# Patient Record
Sex: FEMALE | Race: WHITE | Employment: UNEMPLOYED | URBAN - METROPOLITAN AREA
[De-identification: names, ages, dates, MRNs, and addresses within clinical notes are randomized per-mention and may not be internally consistent; named-entity substitution may affect disease eponyms.]

---

## 2017-02-27 ENCOUNTER — ANESTHESIA EVENT (OUTPATIENT)
Dept: SURGERY | Facility: CLINIC | Age: 27
End: 2017-02-27
Payer: COMMERCIAL

## 2017-02-28 ENCOUNTER — HOSPITAL ENCOUNTER (OUTPATIENT)
Facility: CLINIC | Age: 27
Discharge: HOME | End: 2017-02-28
Attending: OBSTETRICS & GYNECOLOGY | Admitting: OBSTETRICS & GYNECOLOGY
Payer: COMMERCIAL

## 2017-02-28 ENCOUNTER — ANESTHESIA (OUTPATIENT)
Dept: SURGERY | Facility: CLINIC | Age: 27
End: 2017-02-28
Payer: COMMERCIAL

## 2017-02-28 VITALS
SYSTOLIC BLOOD PRESSURE: 133 MMHG | OXYGEN SATURATION: 94 % | WEIGHT: 293 LBS | DIASTOLIC BLOOD PRESSURE: 84 MMHG | BODY MASS INDEX: 43.4 KG/M2 | HEIGHT: 69 IN | TEMPERATURE: 98.3 F | RESPIRATION RATE: 20 BRPM

## 2017-02-28 DIAGNOSIS — G89.18 PAIN AT SURGICAL SITE: Primary | ICD-10-CM

## 2017-02-28 LAB — HCG SERPL QL: NEGATIVE

## 2017-02-28 PROCEDURE — 25000125 ZZHC RX 250: Performed by: OBSTETRICS & GYNECOLOGY

## 2017-02-28 PROCEDURE — 36415 COLL VENOUS BLD VENIPUNCTURE: CPT | Performed by: ANESTHESIOLOGY

## 2017-02-28 PROCEDURE — 84703 CHORIONIC GONADOTROPIN ASSAY: CPT | Performed by: ANESTHESIOLOGY

## 2017-02-28 PROCEDURE — 71000012 ZZH RECOVERY PHASE 1 LEVEL 1 FIRST HR: Performed by: OBSTETRICS & GYNECOLOGY

## 2017-02-28 PROCEDURE — 36000058 ZZH SURGERY LEVEL 3 EA 15 ADDTL MIN: Performed by: OBSTETRICS & GYNECOLOGY

## 2017-02-28 PROCEDURE — 71000027 ZZH RECOVERY PHASE 2 EACH 15 MINS: Performed by: OBSTETRICS & GYNECOLOGY

## 2017-02-28 PROCEDURE — 25800025 ZZH RX 258: Performed by: NURSE ANESTHETIST, CERTIFIED REGISTERED

## 2017-02-28 PROCEDURE — 71000013 ZZH RECOVERY PHASE 1 LEVEL 1 EA ADDTL HR: Performed by: OBSTETRICS & GYNECOLOGY

## 2017-02-28 PROCEDURE — 25000128 H RX IP 250 OP 636: Performed by: NURSE ANESTHETIST, CERTIFIED REGISTERED

## 2017-02-28 PROCEDURE — 25000128 H RX IP 250 OP 636: Performed by: OBSTETRICS & GYNECOLOGY

## 2017-02-28 PROCEDURE — 88305 TISSUE EXAM BY PATHOLOGIST: CPT | Mod: 26 | Performed by: OBSTETRICS & GYNECOLOGY

## 2017-02-28 PROCEDURE — 25000132 ZZH RX MED GY IP 250 OP 250 PS 637: Performed by: OBSTETRICS & GYNECOLOGY

## 2017-02-28 PROCEDURE — 25000132 ZZH RX MED GY IP 250 OP 250 PS 637: Performed by: ANESTHESIOLOGY

## 2017-02-28 PROCEDURE — G0145 SCR C/V CYTO,THINLAYER,RESCR: HCPCS | Performed by: OBSTETRICS & GYNECOLOGY

## 2017-02-28 PROCEDURE — 40000306 ZZH STATISTIC PRE PROC ASSESS II: Performed by: OBSTETRICS & GYNECOLOGY

## 2017-02-28 PROCEDURE — 36000056 ZZH SURGERY LEVEL 3 1ST 30 MIN: Performed by: OBSTETRICS & GYNECOLOGY

## 2017-02-28 PROCEDURE — 25000566 ZZH SEVOFLURANE, EA 15 MIN: Performed by: OBSTETRICS & GYNECOLOGY

## 2017-02-28 PROCEDURE — 25000128 H RX IP 250 OP 636: Performed by: ANESTHESIOLOGY

## 2017-02-28 PROCEDURE — 37000008 ZZH ANESTHESIA TECHNICAL FEE, 1ST 30 MIN: Performed by: OBSTETRICS & GYNECOLOGY

## 2017-02-28 PROCEDURE — 25000125 ZZHC RX 250: Performed by: ANESTHESIOLOGY

## 2017-02-28 PROCEDURE — 37000009 ZZH ANESTHESIA TECHNICAL FEE, EACH ADDTL 15 MIN: Performed by: OBSTETRICS & GYNECOLOGY

## 2017-02-28 PROCEDURE — 25000125 ZZHC RX 250: Performed by: NURSE ANESTHETIST, CERTIFIED REGISTERED

## 2017-02-28 PROCEDURE — 27210794 ZZH OR GENERAL SUPPLY STERILE: Performed by: OBSTETRICS & GYNECOLOGY

## 2017-02-28 PROCEDURE — S0020 INJECTION, BUPIVICAINE HYDRO: HCPCS | Performed by: OBSTETRICS & GYNECOLOGY

## 2017-02-28 PROCEDURE — 88305 TISSUE EXAM BY PATHOLOGIST: CPT | Performed by: OBSTETRICS & GYNECOLOGY

## 2017-02-28 RX ORDER — ONDANSETRON 2 MG/ML
INJECTION INTRAMUSCULAR; INTRAVENOUS PRN
Status: DISCONTINUED | OUTPATIENT
Start: 2017-02-28 | End: 2017-02-28

## 2017-02-28 RX ORDER — CEFAZOLIN SODIUM 1 G/50ML
3 SOLUTION INTRAVENOUS
Status: COMPLETED | OUTPATIENT
Start: 2017-02-28 | End: 2017-02-28

## 2017-02-28 RX ORDER — NALOXONE HYDROCHLORIDE 0.4 MG/ML
.1-.4 INJECTION, SOLUTION INTRAMUSCULAR; INTRAVENOUS; SUBCUTANEOUS
Status: DISCONTINUED | OUTPATIENT
Start: 2017-02-28 | End: 2017-02-28 | Stop reason: HOSPADM

## 2017-02-28 RX ORDER — CEFAZOLIN SODIUM 1 G/3ML
1 INJECTION, POWDER, FOR SOLUTION INTRAMUSCULAR; INTRAVENOUS SEE ADMIN INSTRUCTIONS
Status: DISCONTINUED | OUTPATIENT
Start: 2017-02-28 | End: 2017-02-28 | Stop reason: HOSPADM

## 2017-02-28 RX ORDER — LIDOCAINE HYDROCHLORIDE 10 MG/ML
INJECTION, SOLUTION INFILTRATION; PERINEURAL PRN
Status: DISCONTINUED | OUTPATIENT
Start: 2017-02-28 | End: 2017-02-28

## 2017-02-28 RX ORDER — DEXAMETHASONE SODIUM PHOSPHATE 4 MG/ML
INJECTION, SOLUTION INTRA-ARTICULAR; INTRALESIONAL; INTRAMUSCULAR; INTRAVENOUS; SOFT TISSUE PRN
Status: DISCONTINUED | OUTPATIENT
Start: 2017-02-28 | End: 2017-02-28

## 2017-02-28 RX ORDER — KETOROLAC TROMETHAMINE 30 MG/ML
30 INJECTION, SOLUTION INTRAMUSCULAR; INTRAVENOUS ONCE
Status: COMPLETED | OUTPATIENT
Start: 2017-02-28 | End: 2017-02-28

## 2017-02-28 RX ORDER — BUPIVACAINE HYDROCHLORIDE 2.5 MG/ML
INJECTION, SOLUTION EPIDURAL; INFILTRATION; INTRACAUDAL PRN
Status: DISCONTINUED | OUTPATIENT
Start: 2017-02-28 | End: 2017-02-28 | Stop reason: HOSPADM

## 2017-02-28 RX ORDER — ONDANSETRON 4 MG/1
4 TABLET, ORALLY DISINTEGRATING ORAL EVERY 30 MIN PRN
Status: DISCONTINUED | OUTPATIENT
Start: 2017-02-28 | End: 2017-02-28 | Stop reason: HOSPADM

## 2017-02-28 RX ORDER — SODIUM CHLORIDE, SODIUM LACTATE, POTASSIUM CHLORIDE, CALCIUM CHLORIDE 600; 310; 30; 20 MG/100ML; MG/100ML; MG/100ML; MG/100ML
INJECTION, SOLUTION INTRAVENOUS CONTINUOUS PRN
Status: DISCONTINUED | OUTPATIENT
Start: 2017-02-28 | End: 2017-02-28

## 2017-02-28 RX ORDER — NEOSTIGMINE METHYLSULFATE 1 MG/ML
VIAL (ML) INJECTION PRN
Status: DISCONTINUED | OUTPATIENT
Start: 2017-02-28 | End: 2017-02-28

## 2017-02-28 RX ORDER — ONDANSETRON 2 MG/ML
4 INJECTION INTRAMUSCULAR; INTRAVENOUS EVERY 30 MIN PRN
Status: DISCONTINUED | OUTPATIENT
Start: 2017-02-28 | End: 2017-02-28 | Stop reason: HOSPADM

## 2017-02-28 RX ORDER — SODIUM CHLORIDE, SODIUM LACTATE, POTASSIUM CHLORIDE, CALCIUM CHLORIDE 600; 310; 30; 20 MG/100ML; MG/100ML; MG/100ML; MG/100ML
INJECTION, SOLUTION INTRAVENOUS CONTINUOUS
Status: DISCONTINUED | OUTPATIENT
Start: 2017-02-28 | End: 2017-02-28 | Stop reason: HOSPADM

## 2017-02-28 RX ORDER — GLYCOPYRROLATE 0.2 MG/ML
INJECTION, SOLUTION INTRAMUSCULAR; INTRAVENOUS PRN
Status: DISCONTINUED | OUTPATIENT
Start: 2017-02-28 | End: 2017-02-28

## 2017-02-28 RX ORDER — ACETAMINOPHEN 500 MG
1000 TABLET ORAL ONCE
Status: COMPLETED | OUTPATIENT
Start: 2017-02-28 | End: 2017-02-28

## 2017-02-28 RX ORDER — HYDROMORPHONE HYDROCHLORIDE 1 MG/ML
.3-.5 INJECTION, SOLUTION INTRAMUSCULAR; INTRAVENOUS; SUBCUTANEOUS EVERY 10 MIN PRN
Status: DISCONTINUED | OUTPATIENT
Start: 2017-02-28 | End: 2017-02-28 | Stop reason: HOSPADM

## 2017-02-28 RX ORDER — FENTANYL CITRATE 50 UG/ML
25-50 INJECTION, SOLUTION INTRAMUSCULAR; INTRAVENOUS
Status: DISCONTINUED | OUTPATIENT
Start: 2017-02-28 | End: 2017-02-28 | Stop reason: HOSPADM

## 2017-02-28 RX ORDER — PROPOFOL 10 MG/ML
INJECTION, EMULSION INTRAVENOUS PRN
Status: DISCONTINUED | OUTPATIENT
Start: 2017-02-28 | End: 2017-02-28

## 2017-02-28 RX ORDER — MEPERIDINE HYDROCHLORIDE 25 MG/ML
12.5 INJECTION INTRAMUSCULAR; INTRAVENOUS; SUBCUTANEOUS
Status: DISCONTINUED | OUTPATIENT
Start: 2017-02-28 | End: 2017-02-28 | Stop reason: HOSPADM

## 2017-02-28 RX ORDER — FENTANYL CITRATE 50 UG/ML
INJECTION, SOLUTION INTRAMUSCULAR; INTRAVENOUS PRN
Status: DISCONTINUED | OUTPATIENT
Start: 2017-02-28 | End: 2017-02-28

## 2017-02-28 RX ORDER — HYDROCODONE BITARTRATE AND ACETAMINOPHEN 5; 325 MG/1; MG/1
1 TABLET ORAL ONCE
Status: COMPLETED | OUTPATIENT
Start: 2017-02-28 | End: 2017-02-28

## 2017-02-28 RX ADMIN — PHENYLEPHRINE HYDROCHLORIDE 150 MCG: 10 INJECTION, SOLUTION INTRAMUSCULAR; INTRAVENOUS; SUBCUTANEOUS at 12:34

## 2017-02-28 RX ADMIN — PHENYLEPHRINE HYDROCHLORIDE 100 MCG: 10 INJECTION, SOLUTION INTRAMUSCULAR; INTRAVENOUS; SUBCUTANEOUS at 12:42

## 2017-02-28 RX ADMIN — FENTANYL CITRATE 150 MCG: 50 INJECTION, SOLUTION INTRAMUSCULAR; INTRAVENOUS at 11:41

## 2017-02-28 RX ADMIN — FENTANYL CITRATE 50 MCG: 50 INJECTION, SOLUTION INTRAMUSCULAR; INTRAVENOUS at 12:12

## 2017-02-28 RX ADMIN — ROCURONIUM BROMIDE 50 MG: 10 INJECTION INTRAVENOUS at 11:43

## 2017-02-28 RX ADMIN — GLYCOPYRROLATE 0.8 MG: 0.2 INJECTION, SOLUTION INTRAMUSCULAR; INTRAVENOUS at 13:00

## 2017-02-28 RX ADMIN — MIDAZOLAM HYDROCHLORIDE 2 MG: 1 INJECTION, SOLUTION INTRAMUSCULAR; INTRAVENOUS at 11:37

## 2017-02-28 RX ADMIN — LIDOCAINE HYDROCHLORIDE 50 MG: 10 INJECTION, SOLUTION INFILTRATION; PERINEURAL at 11:41

## 2017-02-28 RX ADMIN — PHENYLEPHRINE HYDROCHLORIDE 100 MCG: 10 INJECTION, SOLUTION INTRAMUSCULAR; INTRAVENOUS; SUBCUTANEOUS at 11:57

## 2017-02-28 RX ADMIN — FENTANYL CITRATE 50 MCG: 50 INJECTION INTRAMUSCULAR; INTRAVENOUS at 13:48

## 2017-02-28 RX ADMIN — PROPOFOL 200 MG: 10 INJECTION, EMULSION INTRAVENOUS at 11:41

## 2017-02-28 RX ADMIN — HYDROMORPHONE HYDROCHLORIDE 0.5 MG: 1 INJECTION, SOLUTION INTRAMUSCULAR; INTRAVENOUS; SUBCUTANEOUS at 13:55

## 2017-02-28 RX ADMIN — PHENYLEPHRINE HYDROCHLORIDE 100 MCG: 10 INJECTION, SOLUTION INTRAMUSCULAR; INTRAVENOUS; SUBCUTANEOUS at 12:21

## 2017-02-28 RX ADMIN — Medication 3 G: at 11:37

## 2017-02-28 RX ADMIN — HYDROCODONE BITARTRATE AND ACETAMINOPHEN 1 TABLET: 5; 325 TABLET ORAL at 16:14

## 2017-02-28 RX ADMIN — FENTANYL CITRATE 50 MCG: 50 INJECTION INTRAMUSCULAR; INTRAVENOUS at 13:33

## 2017-02-28 RX ADMIN — HYDROMORPHONE HYDROCHLORIDE 0.5 MG: 1 INJECTION, SOLUTION INTRAMUSCULAR; INTRAVENOUS; SUBCUTANEOUS at 13:33

## 2017-02-28 RX ADMIN — SODIUM CHLORIDE, POTASSIUM CHLORIDE, SODIUM LACTATE AND CALCIUM CHLORIDE: 600; 310; 30; 20 INJECTION, SOLUTION INTRAVENOUS at 11:37

## 2017-02-28 RX ADMIN — ACETAMINOPHEN 1000 MG: 500 TABLET, FILM COATED ORAL at 11:19

## 2017-02-28 RX ADMIN — FENTANYL CITRATE 50 MCG: 50 INJECTION INTRAMUSCULAR; INTRAVENOUS at 14:19

## 2017-02-28 RX ADMIN — Medication 5 MG: at 13:00

## 2017-02-28 RX ADMIN — DEXAMETHASONE SODIUM PHOSPHATE 4 MG: 4 INJECTION, SOLUTION INTRAMUSCULAR; INTRAVENOUS at 11:43

## 2017-02-28 RX ADMIN — KETOROLAC TROMETHAMINE 30 MG: 30 INJECTION, SOLUTION INTRAMUSCULAR at 11:20

## 2017-02-28 RX ADMIN — PHENYLEPHRINE HYDROCHLORIDE 150 MCG: 10 INJECTION, SOLUTION INTRAMUSCULAR; INTRAVENOUS; SUBCUTANEOUS at 12:28

## 2017-02-28 RX ADMIN — FENTANYL CITRATE 50 MCG: 50 INJECTION, SOLUTION INTRAMUSCULAR; INTRAVENOUS at 13:17

## 2017-02-28 RX ADMIN — ONDANSETRON 4 MG: 2 INJECTION INTRAMUSCULAR; INTRAVENOUS at 12:00

## 2017-02-28 NOTE — IP AVS SNAPSHOT
MRN:[id number]                      After Visit Summary   2/28/2017    Esther Parra    MRN: [id number]           Thank you!     Thank you for choosing [facility] for your care. Our goal is always to provide you with excellent care. Hearing back from our patients is one way we can continue to improve our services. Please take a few minutes to complete the written survey that you may receive in the mail after you visit. If you would like to speak to someone directly about your visit please contact Patient Relations at [phone]. Thank you!          Patient Information     Date Of Birth          1990        About your hospital stay     You were admitted on:  February 28, 2017 You last received care in the:  [facility] PreOP/PostOP    You were discharged on:  February 28, 2017       Who to Call     For medical emergencies, please call 911.  For non-urgent questions about your medical care, please call your primary care provider or clinic, [phone]  For questions related to your surgery, please call your surgery clinic        Attending Provider     Provider Madai Neal MD OB/Gyn       Primary Care Provider Office Phone # Fax #    [facility] [phone] [address] [place] [address]        Your next 10 appointments already scheduled     May 02, 2017  1:30 PM CDT   Six Minute Walk with UC PFL 6 MINUTE WALK 1   [facility] Pulmonary Function Testing ([facility])    [address] [place] [id number] [phone]            May 02, 2017  2:00 PM CDT   PFT VISIT with UC PFL A   [facility] Pulmonary Function Testing ([facility])    [address] [id number] [phone]            May 02, 2017  2:30 PM CDT   (Arrive by 2:15 PM)   Return Interstitial Lung with David Morris Perlman, MD   [facility]  [facility] [facility] ([facility] [place])    [address] [facility]  3rd Floor  [facility] [address] [phone]              Further instructions from your care team       DILATION AND CURETTAGE AND DILATION AND EVACUATION DISCHARGE INSTRUCTIONS    DO NOT DRIVE A CAR, DRINK ALCOHOL OR USE MACHINERY FOR THE NEXT 24 HOURS.  YOU SHOULD WAIT UNTIL YOU HAVE RECOVERED BEFORE MAKING ANY IMPORTANT DECISIONS.    PAIN AND DISCOMFORT  YOU MAY HAVE CRAMPS OR A LOW BACKACHE FOR 24 TO 48 HOURS.  TYLENOL (ACETAMINOPHEN) OR MOTRIN (IBUPROFEN) MAY HELP, OR YOUR DOCTOR MAY GIVE YOU PAIN MEDICINE.  CALL YOUR DOCTOR IF PAIN CANNOT BE CONTROLLED.  YOU MAY FEEL DROWSY AND WEAK FOR A DAY OR TWO.    VAGINAL DISCHARGE  YOU MAY HAVE SOME BLEEDING OR DISCHARGE FOR UP TO TWO WEEKS.  DO NOT DOUCHE, USE TAMPONS OR HAVE SEX (INTERCOURSE) IN THE FIRST WEEK.  CALL YOUR DOCTOR IF YOU SOAK MORE THAN ONE MAXI PAD (SANITARY NAPKIN) PER HOUR, OR IF YOU PASS LARGE BLOOD CLOTS.    OTHER SYMPTOMS  YOU MAY HAVE A LOW FEVER FOR THE FIRST TWO DAYS.  CALL YOUR DOCTOR IF YOUR FEVER GOES OVER 101 DEGREES FAHRENHEIT.    IF YOU HAVE NAUSEA (FEEL SICK TO YOUR STOMACH), STAY IN BED.  TRY DRINKING A SMALL AMOUNT 7-UP, TEA OR SOUP.    DIET AND ACTIVITY  EAT LIGHT MEALS AND DRINK PLENTY OF FLUIDS FOR THE FIRST 24 HOURS (OR LONGER, IF YOU HAVE NAUSEA).    YOU MAY BATHE, SHOWER AND CLIMB STAIRS.  MOST WOMEN CAN RETURN TO WORK AFTER 24 HOURS.  YOU MAY GO BACK TO YOUR OTHER ACTIVITIES AFTER YOUR PAIN GOES AWAY.      GENERAL ANESTHESIA OR SEDATION ADULT DISCHARGE INSTRUCTIONS   SPECIAL PRECAUTIONS FOR 24 HOURS AFTER SURGERY    IT IS NOT UNUSUAL TO FEEL LIGHT-HEADED OR FAINT, UP TO 24 HOURS AFTER SURGERY OR WHILE TAKING PAIN MEDICATION.  IF YOU HAVE THESE SYMPTOMS; SIT FOR A FEW MINUTES BEFORE STANDING AND HAVE SOMEONE ASSIST YOU WHEN YOU GET UP TO WALK OR USE THE BATHROOM.    YOU SHOULD REST AND RELAX FOR THE NEXT 24 HOURS AND YOU MUST  "MAKE ARRANGEMENTS TO HAVE SOMEONE STAY WITH YOU FOR AT LEAST 24 HOURS AFTER YOUR DISCHARGE.  AVOID HAZARDOUS AND STRENUOUS ACTIVITIES.  DO NOT MAKE IMPORTANT DECISIONS FOR 24 HOURS.    DO NOT DRIVE ANY VEHICLE OR OPERATE MECHANICAL EQUIPMENT FOR 24 HOURS FOLLOWING THE END OF YOUR SURGERY.  EVEN THOUGH YOU MAY FEEL NORMAL, YOUR REACTIONS MAY BE AFFECTED BY THE MEDICATION YOU HAVE RECEIVED.    DO NOT DRINK ALCOHOLIC BEVERAGES FOR 24 HOURS FOLLOWING YOUR SURGERY.    DRINK CLEAR LIQUIDS (APPLE JUICE, GINGER ALE, 7-UP, BROTH, ETC.).  PROGRESS TO YOUR REGULAR DIET AS YOU FEEL ABLE.    YOU MAY HAVE A DRY MOUTH, A SORE THROAT, MUSCLES ACHES OR TROUBLE SLEEPING.  THESE SHOULD GO AWAY AFTER 24 HOURS.    CALL YOUR DOCTOR FOR ANY OF THE FOLLOWING:  SIGNS OF INFECTION (FEVER, GROWING TENDERNESS AT THE SURGERY SITE, A LARGE AMOUNT OF DRAINAGE OR BLEEDING, SEVERE PAIN, FOUL-SMELLING DRAINAGE, REDNESS OR SWELLING.    IT HAS BEEN OVER 8 TO 10 HOURS SINCE SURGERY AND YOU ARE STILL NOT ABLE TO URINATE (PASS WATER).     Maximum acetaminophen (Tylenol) dose from all sources should not exceed 4 grams (4000 mg) per day. You had 1000 mg today at 11:20 am      You received Toradol, an IV form of ibuprofen (Motrin) at 11:25 am.  Do not take any ibuprofen products until 05:25 pm.    DR. MADAI MOSQUEDA M.D.       Clinic phone number:  [phone]      Pending Results     Date and Time Order Name Status Description    2/28/2017 1250 Surgical pathology exam In process     2/28/2017 1203 A pap thin layer screen In process             Admission Information     Date & Time Provider Department Dept. Phone    2/28/2017 Madai Mosqueda MD [facility] PreOP/PostOP [phone]      Your Vitals Were     Blood Pressure Temperature Respirations Height Weight Last Period    118/56 (BP Location: Right arm) 98.4  F (36.9  C) (Temporal) 12 1.74 m (5' 8.5\") 161 kg (355 lb) 11/30/2016    Pulse Oximetry BMI (Body Mass Index)                " "94% 53.19 kg/m2          [employer] Information     [employer] lets you send messages to your doctor, view your test results, renew your prescriptions, schedule appointments and more. To sign up, go to [email or web address] . Click on \"Log in\" on the left side of the screen, which will take you to the Welcome page. Then click on \"Sign up Now\" on the right side of the page.     You will be asked to enter the access code listed below, as well as some personal information. Please follow the directions to create your username and password.     Your access code is: [email or web address]  Expires: 2017  1:37 PM     Your access code will  in 90 days. If you need help or a new code, please call your [place] clinic or [phone].        Care EveryWhere ID     This is your Care EveryWhere ID. This could be used by other organizations to access your [place] medical records  [id number]           Review of your medicines      START taking        Dose / Directions    lidocaine 2 % topical gel   Commonly known as:  XYLOCAINE   Used for:  Pain at surgical site        Apply topically 3 times daily as needed for moderate pain   Quantity:  30 mL   Refills:  1         CONTINUE these medicines which may have CHANGED, or have new prescriptions. If we are uncertain of the size of tablets/capsules you have at home, strength may be listed as something that might have changed.        Dose / Directions    acetaZOLAMIDE 250 MG tablet   Commonly known as:  DIAMOX   This may have changed:  when to take this   Used for:  IIH (idiopathic intracranial hypertension), IIH (idiopathic intracranial hypertension)        Dose:  250 mg   Take 1 tablet (250 mg) by mouth 2 times daily   Quantity:  60 tablet   Refills:  11         CONTINUE these medicines which have NOT CHANGED        Dose / Directions    * albuterol 108 (90 BASE) MCG/ACT Inhaler   Commonly known as:  albuterol   Used for:  Hypersensitivity pneumonitis (H)        Dose:  2 puff   Inhale 2 " puffs into the lungs every 6 hours as needed for shortness of breath / dyspnea or wheezing   Quantity:  1 Inhaler   Refills:  3       * albuterol (2.5 MG/3ML) 0.083% neb solution   Used for:  Hypersensitivity pneumonitis (H)        Dose:  2.5 mg   Take 1 vial (2.5 mg) by nebulization 4 times daily   Quantity:  360 mL   Refills:  6       budesonide 1 MG/2ML Susp neb solution   Commonly known as:  PULMICORT   Used for:  Hypersensitivity pneumonitis (H)        Dose:  1 mg   Take 2 mLs (1 mg) by nebulization 2 times daily   Quantity:  60 ampule   Refills:  4       clonazePAM 1 MG tablet   Commonly known as:  klonoPIN        Dose:  0.5-1 mg   Take 0.5-1 tablets by mouth 2 times daily as needed for anxiety.   Quantity:  20 tablet   Refills:  0       Flunisolide HFA 80 MCG/ACT Aers   Used for:  Hypersensitivity pneumonitis (H)        Dose:  2 puff   Inhale 2 puffs into the lungs 2 times daily   Quantity:  1 Inhaler   Refills:  11       IBUPROFEN PO        Dose:  800 mg   Take 800 mg by mouth every 8 hours as needed for moderate pain   Refills:  0       multivitamin, therapeutic with minerals Tabs tablet        Dose:  1 tablet   Take 1 tablet by mouth daily.   Refills:  0       sertraline 100 MG tablet   Commonly known as:  ZOLOFT        Dose:  200 mg   Take 200 mg by mouth daily.   Quantity:  90 tablet   Refills:  3       * Notice:  This list has 2 medication(s) that are the same as other medications prescribed for you. Read the directions carefully, and ask your doctor or other care provider to review them with you.         Where to get your medicines      These medications were sent to [facility] [place], [place] - [address] [facility]  [address] [facility] [address]     Phone:  [phone]     lidocaine 2 % topical gel                Protect others around you: Learn how to safely use, store and throw away your medicines at www.disposemymeds.org.             Medication List: This is a  list of all your medications and when to take them. Check marks below indicate your daily home schedule. Keep this list as a reference.      Medications           Morning Afternoon Evening Bedtime As Needed    acetaZOLAMIDE 250 MG tablet   Commonly known as:  DIAMOX   Take 1 tablet (250 mg) by mouth 2 times daily                                * albuterol 108 (90 BASE) MCG/ACT Inhaler   Commonly known as:  albuterol   Inhale 2 puffs into the lungs every 6 hours as needed for shortness of breath / dyspnea or wheezing                                * albuterol (2.5 MG/3ML) 0.083% neb solution   Take 1 vial (2.5 mg) by nebulization 4 times daily                                budesonide 1 MG/2ML Susp neb solution   Commonly known as:  PULMICORT   Take 2 mLs (1 mg) by nebulization 2 times daily                                clonazePAM 1 MG tablet   Commonly known as:  klonoPIN   Take 0.5-1 tablets by mouth 2 times daily as needed for anxiety.                                Flunisolide HFA 80 MCG/ACT Aers   Inhale 2 puffs into the lungs 2 times daily                                IBUPROFEN PO   Take 800 mg by mouth every 8 hours as needed for moderate pain                                lidocaine 2 % topical gel   Commonly known as:  XYLOCAINE   Apply topically 3 times daily as needed for moderate pain                                multivitamin, therapeutic with minerals Tabs tablet   Take 1 tablet by mouth daily.                                sertraline 100 MG tablet   Commonly known as:  ZOLOFT   Take 200 mg by mouth daily.                                * Notice:  This list has 2 medication(s) that are the same as other medications prescribed for you. Read the directions carefully, and ask your doctor or other care provider to review them with you.

## 2017-02-28 NOTE — DISCHARGE INSTRUCTIONS
DILATION AND CURETTAGE AND DILATION AND EVACUATION DISCHARGE INSTRUCTIONS    DO NOT DRIVE A CAR, DRINK ALCOHOL OR USE MACHINERY FOR THE NEXT 24 HOURS.  YOU SHOULD WAIT UNTIL YOU HAVE RECOVERED BEFORE MAKING ANY IMPORTANT DECISIONS.    PAIN AND DISCOMFORT  YOU MAY HAVE CRAMPS OR A LOW BACKACHE FOR 24 TO 48 HOURS.  TYLENOL (ACETAMINOPHEN) OR MOTRIN (IBUPROFEN) MAY HELP, OR YOUR DOCTOR MAY GIVE YOU PAIN MEDICINE.  CALL YOUR DOCTOR IF PAIN CANNOT BE CONTROLLED.  YOU MAY FEEL DROWSY AND WEAK FOR A DAY OR TWO.    VAGINAL DISCHARGE  YOU MAY HAVE SOME BLEEDING OR DISCHARGE FOR UP TO TWO WEEKS.  DO NOT DOUCHE, USE TAMPONS OR HAVE SEX (INTERCOURSE) IN THE FIRST WEEK.  CALL YOUR DOCTOR IF YOU SOAK MORE THAN ONE MAXI PAD (SANITARY NAPKIN) PER HOUR, OR IF YOU PASS LARGE BLOOD CLOTS.    OTHER SYMPTOMS  YOU MAY HAVE A LOW FEVER FOR THE FIRST TWO DAYS.  CALL YOUR DOCTOR IF YOUR FEVER GOES OVER 101 DEGREES FAHRENHEIT.    IF YOU HAVE NAUSEA (FEEL SICK TO YOUR STOMACH), STAY IN BED.  TRY DRINKING A SMALL AMOUNT 7-UP, TEA OR SOUP.    DIET AND ACTIVITY  EAT LIGHT MEALS AND DRINK PLENTY OF FLUIDS FOR THE FIRST 24 HOURS (OR LONGER, IF YOU HAVE NAUSEA).    YOU MAY BATHE, SHOWER AND CLIMB STAIRS.  MOST WOMEN CAN RETURN TO WORK AFTER 24 HOURS.  YOU MAY GO BACK TO YOUR OTHER ACTIVITIES AFTER YOUR PAIN GOES AWAY.      GENERAL ANESTHESIA OR SEDATION ADULT DISCHARGE INSTRUCTIONS   SPECIAL PRECAUTIONS FOR 24 HOURS AFTER SURGERY    IT IS NOT UNUSUAL TO FEEL LIGHT-HEADED OR FAINT, UP TO 24 HOURS AFTER SURGERY OR WHILE TAKING PAIN MEDICATION.  IF YOU HAVE THESE SYMPTOMS; SIT FOR A FEW MINUTES BEFORE STANDING AND HAVE SOMEONE ASSIST YOU WHEN YOU GET UP TO WALK OR USE THE BATHROOM.    YOU SHOULD REST AND RELAX FOR THE NEXT 24 HOURS AND YOU MUST MAKE ARRANGEMENTS TO HAVE SOMEONE STAY WITH YOU FOR AT LEAST 24 HOURS AFTER YOUR DISCHARGE.  AVOID HAZARDOUS AND STRENUOUS ACTIVITIES.  DO NOT MAKE IMPORTANT DECISIONS FOR 24 HOURS.    DO NOT DRIVE ANY VEHICLE  OR OPERATE MECHANICAL EQUIPMENT FOR 24 HOURS FOLLOWING THE END OF YOUR SURGERY.  EVEN THOUGH YOU MAY FEEL NORMAL, YOUR REACTIONS MAY BE AFFECTED BY THE MEDICATION YOU HAVE RECEIVED.    DO NOT DRINK ALCOHOLIC BEVERAGES FOR 24 HOURS FOLLOWING YOUR SURGERY.    DRINK CLEAR LIQUIDS (APPLE JUICE, GINGER ALE, 7-UP, BROTH, ETC.).  PROGRESS TO YOUR REGULAR DIET AS YOU FEEL ABLE.    YOU MAY HAVE A DRY MOUTH, A SORE THROAT, MUSCLES ACHES OR TROUBLE SLEEPING.  THESE SHOULD GO AWAY AFTER 24 HOURS.    CALL YOUR DOCTOR FOR ANY OF THE FOLLOWING:  SIGNS OF INFECTION (FEVER, GROWING TENDERNESS AT THE SURGERY SITE, A LARGE AMOUNT OF DRAINAGE OR BLEEDING, SEVERE PAIN, FOUL-SMELLING DRAINAGE, REDNESS OR SWELLING.    IT HAS BEEN OVER 8 TO 10 HOURS SINCE SURGERY AND YOU ARE STILL NOT ABLE TO URINATE (PASS WATER).     Maximum acetaminophen (Tylenol) dose from all sources should not exceed 4 grams (4000 mg) per day. You had 1000 mg today at 11:20 am      You received Toradol, an IV form of ibuprofen (Motrin) at 11:25 am.  Do not take any ibuprofen products until 05:25 pm.    DR. CODY TAYLOR M.D.       [facility] phone number:  [phone]

## 2017-02-28 NOTE — ANESTHESIA PREPROCEDURE EVALUATION
Anesthesia Evaluation     . Pt has had prior anesthetic. Type: General    No history of anesthetic complications     ROS/MED HX    ENT/Pulmonary:     (+), . Other pulmonary disease uses supplemental oxygen; post pneumonitis.    Neurologic:  - neg neurologic ROS     Cardiovascular:  - neg cardiovascular ROS       METS/Exercise Tolerance:     Hematologic:  - neg hematologic  ROS       Musculoskeletal:  - neg musculoskeletal ROS       GI/Hepatic:     (+) GERD       Renal/Genitourinary:  - ROS Renal section negative       Endo:     (+) Obesity, .      Psychiatric:     (+) psychiatric history depression      Infectious Disease:  - neg infectious disease ROS       Malignancy:      - no malignancy   Other:    - neg other ROS           Physical Exam  Normal systems: cardiovascular, pulmonary and dental    Airway   Mallampati: III  TM distance: >3 FB  Neck ROM: full    Dental     Cardiovascular       Pulmonary     Other findings: Super-morbid obesity                Anesthesia Plan      History & Physical Review  History and physical reviewed and following examination; no interval change.    ASA Status:  3 .        Plan for General and ETT with Intravenous and Propofol induction. Maintenance will be Balanced.    PONV prophylaxis:  Ondansetron (or other 5HT-3) and Dexamethasone or Solumedrol       Postoperative Care  Postoperative pain management:  IV analgesics and Oral pain medications.      Consents  Anesthetic plan, risks, benefits and alternatives discussed with:  Patient or representative, Patient and Parent (Mother and/or Father)..                          .

## 2017-02-28 NOTE — IP AVS SNAPSHOT
[facility] PreOP/PostOP    [address]    [facility] [address]    Phone:  [phone]    Fax:  [phone]                                       After Visit Summary   2/28/2017    Esther Parra    MRN: [id number]           After Visit Summary Signature Page     I have received my discharge instructions, and my questions have been answered. I have discussed any challenges I see with this plan with the nurse or doctor.    ..........................................................................................................................................  Patient/Patient Representative Signature      ..........................................................................................................................................  Patient Representative Print Name and Relationship to Patient    ..................................................               ................................................  Date                                            Time    ..........................................................................................................................................  Reviewed by Signature/Title    ...................................................              ..............................................  Date                                                            Time

## 2017-02-28 NOTE — ANESTHESIA POSTPROCEDURE EVALUATION
Patient: Esther Parra    Procedure(s):  HYMENOTOMY, SHARP  DILATION AND CURETTAGE, PAP SMEAR, ATTEMPTED HYSTEROSCOPY  - Wound Class: II-Clean Contaminated   - Wound Class: II-Clean Contaminated    Diagnosis:menorrhagia  Diagnosis Additional Information: No value filed.    Anesthesia Type:  General, ETT    Note:  Anesthesia Post Evaluation    Patient location during evaluation: PACU  Patient participation: Able to fully participate in evaluation  Level of consciousness: awake and alert  Pain management: adequate  Airway patency: patent  Cardiovascular status: acceptable  Respiratory status: acceptable  Hydration status: acceptable  PONV: none     Anesthetic complications: None          Last vitals:  Vitals:    02/28/17 1348 02/28/17 1355 02/28/17 1400   BP:   110/72   Resp: 13 15 14   Temp:      SpO2: 98% 92% (!) 89%         Electronically Signed By: Aryan Magana MD  February 28, 2017  2:12 PM

## 2017-02-28 NOTE — ANESTHESIA CARE TRANSFER NOTE
Patient: Esther Parra    Procedure(s):  HYMENOTOMY, SHARP  DILATION AND CURETTAGE, PAP SMEAR, ATTEMPTED HYSTEROSCOPY  - Wound Class: II-Clean Contaminated   - Wound Class: II-Clean Contaminated    Diagnosis: menorrhagia  Diagnosis Additional Information: No value filed.    Anesthesia Type:   General, ETT     Note:  Airway :Face Mask  Patient transferred to:PACU  Comments: Patient oral suctioned. Patient with spontaneous respirations and adequate tidal volumes. Patient awake and responsive. Extubated in OR to 10 L face tent. To PACU ventilating well. VSS. Report given.      Vitals: (Last set prior to Anesthesia Care Transfer)    CRNA VITALS  2/28/2017 1243 - 2/28/2017 1321      2/28/2017             NIBP: 117/89    Pulse: 106    NIBP Mean: 94    SpO2: 99 %    Resp Rate (observed): 13    EKG: NSR                Electronically Signed By: THEO Odonnell CRNA  February 28, 2017  1:21 PM

## 2017-02-28 NOTE — BRIEF OP NOTE
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  Morbid obesity     Post-operative diagnosis Same  Fenestrated hymen with central band   Procedure: Procedure(s):  HYMENOTOMY, SHARP  DILATION AND CURETTAGE, PAP SMEAR, ATTEMPTED HYSTEROSCOPY  - Wound Class: II-Clean Contaminated   - Wound Class: II-Clean Contaminated   Surgeon(s): Surgeon(s) and Role:     * Madai Moqsueda MD - Primary   Estimated blood loss: * No values recorded between 2/28/2017 11:50 AM and 2/28/2017  1:13 PM *    Specimens:   ID Type Source Tests Collected by Time Destination   A : cervical pap smear Brushing Cervix A PAP THIN LAYER SCREEN Madai Mosqueda MD 2/28/2017 12:00 PM    B : endometrial and endocervical  curretings Tissue Endometrium SURGICAL PATHOLOGY EXAM Madai Mosqueda MD 2/28/2017 12:48 PM       Findings: Fenestrated hymen with central tissue band. Inability to generate enough pressure for hysteroscopy. Sharp dilation and currettage with moderate tissue.     Madai Mosqueda MD

## 2017-02-28 NOTE — OR NURSING
Reported to Dr. Magana that pt has taken ibuprofen 800 mg yesterday and one other time this past week.

## 2017-03-01 LAB — COPATH REPORT: NORMAL

## 2017-03-02 LAB
COPATH REPORT: NORMAL
PAP: NORMAL

## 2017-03-02 NOTE — OP NOTE
DATE OF PROCEDURE:  02/28/2017       PREOPERATIVE DIAGNOSES:   1.  A 27-year-old G0 female with severe oligomenorrhea.   2.  Morbid obesity with chronic lung disease.   3.  Likely intact hymen with inability to tolerate any exam.      POSTOPERATIVE DIAGNOSES:   1.  A 27-year-old G0 female with severe oligomenorrhea.   2.  Morbid obesity with chronic lung disease.   3.  Likely intact hymen with inability to tolerate any exam.   4.  Confirmed fenestrated hymen with minimal opening.   5.  Inability to successfully distend the cavity due to cervical stenosis and intraabdominal pressure.      PROCEDURE:  Hymenectomy, Pap smear collection, attempted hysteroscopy, sharp dilation and curettage.      SURGEON:  Madai Mosqueda MD      ANESTHESIA:  General via endotracheal.      INDICATIONS:  Esther Parra is a 27-year-old G0 female who presented for evaluation due to chronic oligomenorrhea, occasionally going 3 years without menstruating.  She suffers from morbid obesity with a weight of 355 pounds the day of surgery and is at significant risk for endometrial hyperplasia and malignancy.  Given her chronic oligomenorrhea, the patient was consented for evaluation of the pelvic anatomy.  She disclosed at the time of consultation that she has an inability to have anything in the vagina, including fingers or tampons.  At that time, we discussed the need for hymenectomy, evaluation of the cervix and uterus via hysteroscopy and dilation and curettage to ensure endometrial sampling versus a pelvic ultrasound.  She was consented for hymenectomy, hysteroscopy, dilation and curettage and possible Mirena IUD placement.  She would also like a Pap smear performed given no prior gynecologic exams.      DESCRIPTION OF PROCEDURE:  The patient was taken to the operating room, where general anesthesia was found to be adequate.  She was prepped and draped in the normal sterile fashion in high lithotomy position in [facility].  Given the  patient's pulmonary disease and body habitus, care was taken to minimize supine position and intraabdominal pressure.  At this point, the vulva was inspected, and a fenestrated hymen was noted with 2 distinct openings less than 2 mm each.  The area was infiltrated with a dilute Marcaine with epinephrine.  Metzenbaum scissors were used to remove the segment of hymen transecting the midline attached to the underside of the urethra as well as the remaining intact hymenal ring peripherally in a circumferential fashion.  The residual incision site was closed with 3-0 Vicryl in a running fashion.  There was 1 figure-of-eight suture of 3-0 Vicryl placed on the underside of the urethra where the attached hymenal tag had been.  Complete hemostasis was obtained.  At this point, an extra-long Yury speculum was placed into the vagina and the cervix was visualized. Pap smear performed. The anterior lip of the cervix was grasped with a single-tooth tenaculum and cervical dilation was attempted.  Due to significant cervical stenosis, dilation was very difficult, and we were unable to proceed with dilation past 4 mm.  At this point, a 2.7 mm diagnostic hysteroscope was attempted to be placed into the uterus, but despite using an Aquilex fluid system for increasing intrauterine pressure, we were unable to advance the hysteroscope past the inner cervical os and visualized the cavity.  This portion of the procedure was extremely difficult due to patient's habitus and limitations on respiratory effort.  It took approximately 30 minutes, at which point we decided to abandon hysteroscopy and proceed with sharp dilation and curettage.  A 4 mm sharp curet was then introduced into the uterus and specimen was obtained.  It was unclear at the time of cyst was endometrial or endocervical specimen.  At this point, the single-tooth tenaculum was removed and bleeding at the tenaculum sites was treated with silver nitrate.  The bleeding was  controlled, and there were no complications.  There was no further bleeding at the hymenectomy surgical site, and the area was again infiltrated with dilute Marcaine for comfort after surgery.  Sponge, lap and needle counts were correct x2, and there were no complications other than inability to proceed with full hysteroscopy.      FINDINGS:  A significantly stenotic cervix.  Uterus difficult to reach with standard equipment and inability to fully dilate the uterine cavity due to intraabdominal pressure.  Scant tissue obtained on sharp dilation and curettage, unclear whether endocervical or endometrial.         CODY TAYLOR MD             D: 2017 08:41   T: 2017 14:55   MT: [id number]      Name:     HUGO RUBIO   MRN:      [id number]        Account:        [id number]   :      1990           Procedure Date: 2017      Document: [id number]

## 2018-07-03 ENCOUNTER — OFFICE VISIT (OUTPATIENT)
Dept: OPHTHALMOLOGY | Facility: CLINIC | Age: 28
End: 2018-07-03
Attending: OPHTHALMOLOGY
Payer: COMMERCIAL

## 2018-07-03 DIAGNOSIS — G93.2 IIH (IDIOPATHIC INTRACRANIAL HYPERTENSION): Primary | ICD-10-CM

## 2018-07-03 DIAGNOSIS — H47.10 OPTIC DISC EDEMA: ICD-10-CM

## 2018-07-03 DIAGNOSIS — G93.2 IIH (IDIOPATHIC INTRACRANIAL HYPERTENSION): ICD-10-CM

## 2018-07-03 PROCEDURE — 92133 CPTRZD OPH DX IMG PST SGM ON: CPT | Mod: ZF | Performed by: OPHTHALMOLOGY

## 2018-07-03 PROCEDURE — G0463 HOSPITAL OUTPT CLINIC VISIT: HCPCS | Mod: ZF

## 2018-07-03 PROCEDURE — 92083 EXTENDED VISUAL FIELD XM: CPT | Mod: ZF | Performed by: OPHTHALMOLOGY

## 2018-07-03 ASSESSMENT — SLIT LAMP EXAM - LIDS
COMMENTS: NORMAL
COMMENTS: NORMAL

## 2018-07-03 ASSESSMENT — REFRACTION_WEARINGRX
OS_CYLINDER: SPHERE
OD_SPHERE: -1.25
OD_CYLINDER: SPHERE
SPECS_TYPE: SVL
OS_SPHERE: -1.25

## 2018-07-03 ASSESSMENT — VISUAL ACUITY
OD_CC: 20/20
METHOD: SNELLEN - LINEAR
OS_CC: 20/20
CORRECTION_TYPE: GLASSES

## 2018-07-03 ASSESSMENT — TONOMETRY
IOP_METHOD: TONOPEN
OD_IOP_MMHG: 22
OS_IOP_MMHG: 21

## 2018-07-03 ASSESSMENT — EXTERNAL EXAM - LEFT EYE: OS_EXAM: NORMAL

## 2018-07-03 ASSESSMENT — CONF VISUAL FIELD
METHOD: COUNTING FINGERS
OS_NORMAL: 1
OD_NORMAL: 1

## 2018-07-03 ASSESSMENT — EXTERNAL EXAM - RIGHT EYE: OD_EXAM: NORMAL

## 2018-07-03 NOTE — MR AVS SNAPSHOT
After Visit Summary   7/3/2018    Esther Parra    MRN: [id number]           Patient Information     Date Of Birth          1990        Visit Information        Provider Department      7/3/2018 8:00 AM Amad[facility] MD [facility]        Today's Diagnoses     Optic disc edema        IIH (idiopathic intracranial hypertension)           Follow-ups after your visit        Follow-up notes from your care team     Return in about 3 months (around 10/3/2018) for Vision, color, tension, dilate, RNFL.      Your next 10 appointments already scheduled     Aug 31, 2018  8:00 AM CDT   FULL PULMONARY FUNCTION with  PFL [facility] Pulmonary Function Testing ([facility])    [address]  3rd Floor  [facility] [id number] [phone]            Aug 31, 2018  9:00 AM CDT   (Arrive by 8:45 AM)   Return Interstitial Lung with David Morris Perlman, MD   [facility] ([facility])    [address]  [address]  [facility] [id number] [phone]            Oct 04, 2018  2:30 PM CDT   RETURN NEURO with Amadou Day MD   [facility] ([facility])    [address]  [address] [address]  [facility] [address] [phone]              Future tests that were ordered for you today     Open Future Orders        Priority Expected Expires Ordered    DILATED FUNDUS EXAM Routine  9/1/2018 7/3/2018            Who to contact     Please call your clinic at [phone] to:    Ask questions about your health    Make or cancel appointments    Discuss your medicines    Learn about your test results    Speak to your doctor            Additional Information About Your Visit        [employer] Information     [employer] is an electronic gateway that provides easy, online access to your medical records. With [employer], you can request a clinic appointment, read your test results,  renew a prescription or communicate with your care team.     To sign up for [employer] visit the website at [email or web address]   You will be asked to enter the access code listed below, as well as some personal information. Please follow the directions to create your username and password.     Your access code is: [email or web address]  Expires: 2018  6:31 AM     Your access code will  in 90 days. If you need help or a new code, please contact your [facility] or call [phone] for assistance.        Care EveryWhere ID     This is your Care EveryWhere ID. This could be used by other organizations to access your [place] medical records  [id number]         Blood Pressure from Last 3 Encounters:   17 133/84   16 125/82   16 132/85    Weight from Last 3 Encounters:   17 (!) 161 kg (355 lb)   16 (!) 160.1 kg (353 lb)   16 (!) 158.8 kg (350 lb)              We Performed the Following     Color Vision - Screening OU (both eyes)     Glaucoma Top OU     IOP Measurement     OCT Optic Nerve RNFL Spectralis OU (both eyes)          Today's Medication Changes          These changes are accurate as of 7/3/18  9:10 AM.  If you have any questions, ask your nurse or doctor.               These medicines have changed or have updated prescriptions.        Dose/Directions    acetaZOLAMIDE 250 MG tablet   Commonly known as:  DIAMOX   This may have changed:  when to take this   Used for:  IIH (idiopathic intracranial hypertension), IIH (idiopathic intracranial hypertension)        Dose:  250 mg   Take 1 tablet (250 mg) by mouth 2 times daily   Quantity:  60 tablet   Refills:  11                Primary Care Provider Office Phone # Fax #    [facility] [phone] [address] [place] [address]        Equal Access to Services     CARMITA PITT AH: Arlen Anderson, kristy briones, damion pond,  radha rome anahi bloom'aan ah. So [facility] [phone].    ATENCIÓN: Si fay gonzalez, tiene a marroquin disposición servicios gratuitos de asistencia lingüística. Elizabeth givens [phone].    We comply with applicable federal civil rights laws and [place] laws. We do not discriminate on the basis of race, color, national origin, age, disability, sex, sexual orientation, or gender identity.            Thank you!     Thank you for choosing [facility]  for your care. Our goal is always to provide you with excellent care. Hearing back from our patients is one way we can continue to improve our services. Please take a few minutes to complete the written survey that you may receive in the mail after your visit with us. Thank you!             Your Updated Medication List - Protect others around you: Learn how to safely use, store and throw away your medicines at www.disposemymeds.org.          This list is accurate as of 7/3/18  9:10 AM.  Always use your most recent med list.                   Brand Name Dispense Instructions for use Diagnosis    acetaZOLAMIDE 250 MG tablet    DIAMOX    60 tablet    Take 1 tablet (250 mg) by mouth 2 times daily    IIH (idiopathic intracranial hypertension), IIH (idiopathic intracranial hypertension)       * albuterol 108 (90 Base) MCG/ACT Inhaler    PROAIR HFA    1 Inhaler    Inhale 2 puffs into the lungs every 6 hours as needed for shortness of breath / dyspnea or wheezing    Hypersensitivity pneumonitis (H)       * albuterol (2.5 MG/3ML) 0.083% neb solution     360 mL    Take 1 vial (2.5 mg) by nebulization 4 times daily    Hypersensitivity pneumonitis (H)       budesonide 1 MG/2ML Susp neb solution    PULMICORT    60 ampule    Take 2 mLs (1 mg) by nebulization 2 times daily    Hypersensitivity pneumonitis (H)       clonazePAM 1 MG tablet    klonoPIN    20 tablet    Take 0.5-1 tablets by mouth 2 times daily as needed for anxiety.        flunisolide HFA 80 MCG/ACT Aers oral inhaler     AEROSPAN    1 Inhaler    Inhale 2 puffs into the lungs 2 times daily    Hypersensitivity pneumonitis (H)       IBUPROFEN PO      Take 800 mg by mouth every 8 hours as needed for moderate pain        lidocaine 2 % topical gel    XYLOCAINE    30 mL    Apply topically 3 times daily as needed for moderate pain    Pain at surgical site       multivitamin, therapeutic with minerals Tabs tablet      Take 1 tablet by mouth daily.        sertraline 100 MG tablet    ZOLOFT    90 tablet    Take 200 mg by mouth daily.        * Notice:  This list has 2 medication(s) that are the same as other medications prescribed for you. Read the directions carefully, and ask your doctor or other care provider to review them with you.

## 2018-07-03 NOTE — PROGRESS NOTES
Assessment & Plan     Esther Parra is a 28 year old female with the following diagnoses:   1. Optic disc edema    2. IIH (idiopathic intracranial hypertension)       Patient Last time was seen by Dr. Day on 4/11/2016. 28 year old female patient with history of IIH(a normal MRI and MRV brain and elevated opening pressure on lumbar puncture). When she was seen last time by Dr. Day, the pt stopped taking diamox a few months ago secondary to painful paraesthesias (250 mg TID). On exam at that time, she had less papilledema. The plan was to restart diamox at a lower dose (250mg orally bid) vs. Weight loss. Today the patient is here for regular follow up.     The pt reported that she was taking her diamox after that, she met nutritionist, lost weight initially, after that she got the flue couple of time, in addition to depression and anxiety, thus she canceled her appointment to come her couple times. She is back on mental health medication and she needs to address her health issue in the meantime.    The pt reported that she lost about 30 pounds after she was seen here and then she gained them back in addition to 10 pounds in the last 1.5 year. The pt ran out the diamox about a year ago and stopped follow up to pursue having another prescription because of the other medical issues that she has been dealing with.  The pt reported some blurry vision even when she wears glasses(glasses for distance), once in the while she gets pain in the eyes, sharp pain inside the actual eye ball. Once in the while she gets mild to moderate headache. More recently she noticed a lot of nausea(intermittenlt, not every day) and little bit of vomiting. The pt denied peripheral vision loss, double vision, ptosis.The patient past medical history includes: Hypersensitivity pneumonitis, anxiety, depression. The pt is on, sertraline, clonazepam, ibuprofen, multivitamins.     Reports intermittent eye pain.  Occurs approximately once a  week.  It is so bad that she has to pull over the car if driving.      Last MRI that was done here 10/2014 that showed: Bulging optic discs consistent with papilledema. Relatively small transverse sinus to suggest increased intracranial pressure. These findings may indicate idiopathic intracranial hypertension (pseudotumor cerebri).    Last MRV 10/2014 that showed No venous sinus thrombosis identified. There appear to be areas of moderate-severe stenosis in both the right and left transverse sinuses.  There can be artifacts in these\ areas on noncontrast scans because of flow related abnormalities. But areas of stenosis in these regions can be a cause of idiopathic intracranial hypertension. A contrast enhanced MR venogram or a contrast-enhanced CT venogram would be helpful to confirm these areas of stenosis in the transverse sinuses.      Visual acuity today 20/20 both eyes.  Color vision normal.. Pupils normal with no afferent pupillary defect.  Anterior segment exam normal.  Fundus exam shows trace optic disc edema both eyes with no spontaneous venous pulsations, and mildly tortuous vessels both eyes.  visual field normal both eyes.      It is my impression that patient's optic disc edema has improved since last visit despite being off Diamox.  I think it would be okay to continue off Diamox for now as patient is currently working on weight loss.  Will have her follow up in 3-4 months.  If the optic disc edema is improved or headaches have worsened will need to consider restarting the Diamox.  Weight loss goal of 20-25 pounds.             Attending Physician Attestation:  Complete documentation of historical and exam elements from today's encounter can be found in the full encounter summary report (not reduplicated in this progress note).  I personally obtained the chief complaint(s) and history of present illness.  I confirmed and edited as necessary the review of systems, past medical/surgical history, family  history, social history, and examination findings as documented by others; and I examined the patient myself.  I personally reviewed the relevant tests, images, and reports as documented above.  I formulated and edited as necessary the assessment and plan and discussed the findings and management plan with the patient and family. - Amadou Vasques [facility]  Neurology resident   Pager: [phone]

## 2018-08-31 ENCOUNTER — OFFICE VISIT (OUTPATIENT)
Dept: PULMONOLOGY | Facility: CLINIC | Age: 28
End: 2018-08-31
Attending: INTERNAL MEDICINE
Payer: COMMERCIAL

## 2018-08-31 VITALS
RESPIRATION RATE: 18 BRPM | BODY MASS INDEX: 43.4 KG/M2 | HEIGHT: 69 IN | SYSTOLIC BLOOD PRESSURE: 120 MMHG | DIASTOLIC BLOOD PRESSURE: 82 MMHG | WEIGHT: 293 LBS | OXYGEN SATURATION: 96 % | HEART RATE: 94 BPM

## 2018-08-31 DIAGNOSIS — J67.9 HYPERSENSITIVITY PNEUMONITIS (H): ICD-10-CM

## 2018-08-31 DIAGNOSIS — J45.909 ASTHMA: Primary | ICD-10-CM

## 2018-08-31 DIAGNOSIS — J67.9 HYPERSENSITIVITY PNEUMONITIS (H): Primary | ICD-10-CM

## 2018-08-31 PROCEDURE — G0463 HOSPITAL OUTPT CLINIC VISIT: HCPCS | Mod: ZF

## 2018-08-31 RX ORDER — ALBUTEROL SULFATE 0.83 MG/ML
2.5 SOLUTION RESPIRATORY (INHALATION) 4 TIMES DAILY
Qty: 360 ML | Refills: 6 | Status: SHIPPED | OUTPATIENT
Start: 2018-08-31 | End: 2019-10-27

## 2018-08-31 RX ORDER — ALBUTEROL SULFATE 90 UG/1
2 AEROSOL, METERED RESPIRATORY (INHALATION) EVERY 6 HOURS PRN
Qty: 1 INHALER | Refills: 3 | Status: SHIPPED | OUTPATIENT
Start: 2018-08-31 | End: 2018-08-31 | Stop reason: ALTCHOICE

## 2018-08-31 RX ORDER — BUDESONIDE 1 MG/2ML
1 INHALANT ORAL 2 TIMES DAILY
Qty: 60 AMPULE | Refills: 4 | Status: SHIPPED | OUTPATIENT
Start: 2018-08-31 | End: 2019-11-05

## 2018-08-31 RX ORDER — ALBUTEROL SULFATE 90 UG/1
2 AEROSOL, METERED RESPIRATORY (INHALATION) EVERY 6 HOURS PRN
Qty: 1 INHALER | Refills: 1 | Status: SHIPPED | OUTPATIENT
Start: 2018-08-31 | End: 2019-02-18

## 2018-08-31 ASSESSMENT — PAIN SCALES - GENERAL: PAINLEVEL: NO PAIN (0)

## 2018-08-31 NOTE — MR AVS SNAPSHOT
After Visit Summary   8/31/2018    Roxane Holcomb    MRN: [id number]           Patient Information     Date Of Birth          1990        Visit Information        Provider Department      8/31/2018 9:00 AM Perlman, David Morris, MD [facility]        Today's Diagnoses     Hypersensitivity pneumonitis (H)           Follow-ups after your visit        Follow-up notes from your care team     Return in about 4 months (around 12/31/2018).      Your next 10 appointments already scheduled     Oct 04, 2018  2:30 PM CDT   RETURN NEURO with Amadou Day MD   [facility] ([facility])    [address]  [address] [address]  [facility] [address]   [phone]            Camron 10, 2019 11:30 AM CST   SIX MINUTE WALK with UC PFL 6 MINUTE WALK 1, UC PFL A   [facility] Pulmonary Function Testing ([facility])    [address]  3rd Floor  [facility] [id number] [phone]            Camron 10, 2019  1:00 PM CST   (Arrive by 12:45 PM)   Return Interstitial Lung with David Morris Perlman, MD   [facility] ([facility])    [address]  [address]  [facility] [id number] [phone]              Future tests that were ordered for you today     Open Future Orders        Priority Expected Expires Ordered    General PFT Lab (Please always keep checked) Routine  8/31/2019 8/31/2018    Pulmonary Function Test Routine  8/31/2019 8/31/2018    6 minute walk test Routine  8/31/2019 8/31/2018            Who to contact     If you have questions or need follow up information about today's clinic visit or your schedule please contact [facility] directly at [phone].  Normal or non-critical lab and imaging results will be communicated to you by MyChart, letter or phone within 4 business days after the clinic has  "received the results. If you do not hear from us within 7 days, please contact the clinic through [employer] or phone. If you have a critical or abnormal lab result, we will notify you by phone as soon as possible.  Submit refill requests through [employer] or call your pharmacy and they will forward the refill request to us. Please allow 3 business days for your refill to be completed.          Additional Information About Your Visit        [employer] Information     [employer] lets you send messages to your doctor, view your test results, renew your prescriptions, schedule appointments and more. To sign up, go to [email or web address] . Click on \"Log in\" on the left side of the screen, which will take you to the Welcome page. Then click on \"Sign up Now\" on the right side of the page.     You will be asked to enter the access code listed below, as well as some personal information. Please follow the directions to create your username and password.     Your access code is: [email or web address]  Expires: 2018  7:51 AM     Your access code will  in 90 days. If you need help or a new code, please call your [place] clinic or [phone].        Care EveryWhere ID     This is your Care EveryWhere ID. This could be used by other organizations to access your [place] medical records  [id number]        Your Vitals Were     Pulse Respirations Height Pulse Oximetry BMI (Body Mass Index)       94 18 1.74 m (5' 8.5\") 96% 55.44 kg/m2        Blood Pressure from Last 3 Encounters:   18 120/82   17 133/84   16 125/82    Weight from Last 3 Encounters:   18 (!) 167.8 kg (370 lb)   17 (!) 161 kg (355 lb)   16 (!) 160.1 kg (353 lb)                 Today's Medication Changes          These changes are accurate as of 18  9:17 AM.  If you have any questions, ask your nurse or doctor.               These medicines have changed or have updated prescriptions.        Dose/Directions    acetaZOLAMIDE 250 " MG tablet   Commonly known as:  DIAMOX   This may have changed:  when to take this   Used for:  IIH (idiopathic intracranial hypertension), IIH (idiopathic intracranial hypertension)        Dose:  250 mg   Take 1 tablet (250 mg) by mouth 2 times daily   Quantity:  60 tablet   Refills:  11         Stop taking these medicines if you haven't already. Please contact your care team if you have questions.     lidocaine 2 % topical gel   Commonly known as:  XYLOCAINE   Stopped by:  Perlman, David Morris, MD                Where to get your medicines      These medications were sent to [facility] [address] [facility] [address] [facility] [address] [address] [facility], [facility] [address]     Phone:  [phone]     albuterol (2.5 MG/3ML) 0.083% neb solution    albuterol 108 (90 Base) MCG/ACT inhaler    budesonide 1 MG/2ML Susp neb solution                Primary Care Provider Office Phone # Fax #    [facility] [phone] [address]   [facility] [address]        Equal Access to Services     Venc[facility] HospitalMARLEN : Hadii brian walker hadasho Soshanti, waaxda luqadaha, qaybta kaalmacésar pond, radha llanes. So [facility] [phone].    ATENCIÓN: Si habla español, tiene a marroquin disposición servicios gratsimonaos de asistencia lingüística. [facility] [phone].    We comply with applicable federal civil rights laws and [place] laws. We do not discriminate on the basis of race, color, national origin, age, disability, sex, sexual orientation, or gender identity.            Thank you!     Thank you for choosing [facility]  for your care. Our goal is always to provide you with excellent care. Hearing back from our patients is one way we can continue to improve our services. Please take a few minutes to complete the written survey that you may receive in the mail after your visit with us. Thank you!             Your Updated  Medication List - Protect others around you: Learn how to safely use, store and throw away your medicines at www.disposemymeds.org.          This list is accurate as of 8/31/18  9:17 AM.  Always use your most recent med list.                   Brand Name Dispense Instructions for use Diagnosis    acetaZOLAMIDE 250 MG tablet    DIAMOX    60 tablet    Take 1 tablet (250 mg) by mouth 2 times daily    IIH (idiopathic intracranial hypertension), IIH (idiopathic intracranial hypertension)       * albuterol 108 (90 Base) MCG/ACT inhaler    PROAIR HFA    1 Inhaler    Inhale 2 puffs into the lungs every 6 hours as needed for shortness of breath / dyspnea or wheezing    Hypersensitivity pneumonitis (H)       * albuterol (2.5 MG/3ML) 0.083% neb solution     360 mL    Take 1 vial (2.5 mg) by nebulization 4 times daily    Hypersensitivity pneumonitis (H)       budesonide 1 MG/2ML Susp neb solution    PULMICORT    60 ampule    Take 2 mLs (1 mg) by nebulization 2 times daily    Hypersensitivity pneumonitis (H)       clonazePAM 1 MG tablet    klonoPIN    20 tablet    Take 0.5-1 tablets by mouth 2 times daily as needed for anxiety.        flunisolide HFA 80 MCG/ACT Aers oral inhaler    AEROSPAN    1 Inhaler    Inhale 2 puffs into the lungs 2 times daily    Hypersensitivity pneumonitis (H)       IBUPROFEN PO      Take 800 mg by mouth every 8 hours as needed for moderate pain        multivitamin, therapeutic with minerals Tabs tablet      Take 1 tablet by mouth daily.        sertraline 100 MG tablet    ZOLOFT    90 tablet    Take 200 mg by mouth daily.        * Notice:  This list has 2 medication(s) that are the same as other medications prescribed for you. Read the directions carefully, and ask your doctor or other care provider to review them with you.

## 2018-08-31 NOTE — LETTER
8/31/2018       RE: Roxane Holcomb  [address] [facility] [id number]     Dear Colleague,    Thank you for referring your patient, Roxane Holcomb, to the [facility] at [facility]. Please see a copy of my visit note below.    Reason for Visit  Roxane Holcomb is a 28 year old year old female who is being seen for RECHECK (F/U HP)    ILD HPI    Roxane Holcomb is a 28-year-old female who follows up today for hypersensitivity pneumonitis.  The patient has a diagnosis of hypersensitivity pneumonitis which has manifested itself is mainly persistent groundglass opacities without evidence of fibrosis.  She has had positive serologies do pigeon serum and aspergillus and did previously have a bird however she got rid of the bird quite some time ago and there are no ongoing exposures we are aware of.  I have not seen the patient in 2 years she has significant depression which has prevented her from going to doctor's appointments recently.  She also has morbid obesity with a body mass index of 58.  Returns clinic today overall stating her breathing is stable.  She has not been on any systemic treatment for some time she was supposed to be on inhaled steroids with Pulmicort this was restarted by her primary care physician recently.  She also uses albuterol nebs twice a day and has an albuterol inhaler which she uses intermittently.  She has been on oxygen chronically typically 2 L at rest increasing up to 4 L with exertion.  Overall she does note that she feels weaker than the last time I saw her although she feels this is likely due to inactivity her depression is quite severe and she states some days she is not able to get out of bed at all.  She is undergoing therapy and is on medications for this.      Current Outpatient Prescriptions   Medication     acetaZOLAMIDE (DIAMOX) 250 MG tablet     albuterol (2.5 MG/3ML) 0.083% neb solution     albuterol (PROAIR  HFA) 108 (90 Base) MCG/ACT inhaler     budesonide (PULMICORT) 1 MG/2ML SUSP neb solution     clonazePAM (KLONOPIN) 1 MG tablet     Flunisolide HFA 80 MCG/ACT AERS     IBUPROFEN PO     multivitamin, therapeutic with minerals (THERA-VIT-M) TABS     sertraline (ZOLOFT) 100 MG tablet     [DISCONTINUED] albuterol (2.5 MG/3ML) 0.083% nebulizer solution     [DISCONTINUED] albuterol (ALBUTEROL) 108 (90 BASE) MCG/ACT inhaler     [DISCONTINUED] budesonide (PULMICORT) 1 MG/2ML SUSP nebulizer solution     No current facility-administered medications for this visit.      Allergies   Allergen Reactions     Blood Transfusion Related (Informational Only) Other (See Comments)     Patient has a history of a clinically significant antibody against RBC antigens.  A delay in compatible RBCs may occur.     Blood-Group Specific Substance Other (See Comments)     Patient has a history of a clinically significant antibody against RBC antigens.  A delay in compatible RBCs may occur.     Seasonal Allergies      Past Medical History:   Diagnosis Date     Agoraphobia with panic attacks     8/15/2011     Anxiety      Arthritis      Depressive disorder      History of wisdom tooth extraction      Oxygen dependent     2L Nasal cannula     Pneumonitis     1/26/2015       Past Surgical History:   Procedure Laterality Date     DILATION AND CURETTAGE, HYSTEROSCOPY DIAGNOSTIC, COMBINED N/A 2/28/2017    Procedure: COMBINED DILATION AND CURETTAGE, HYSTEROSCOPY DIAGNOSTIC;  Surgeon: Madai Mosqueda MD;  Location:  OR     HYMENOTOMY N/A 2/28/2017    Procedure: HYMENOTOMY;  Surgeon: Madai Mosqueda MD;  Location:  OR     THORACOSCOPIC WEDGE RESECTION LUNG Right 1/30/2015    Procedure: THORACOSCOPIC WEDGE RESECTION LUNG;  Surgeon: Delvin Reyes MD;  Location:  OR       Social History     Social History     Marital status: Single     Spouse name: N/A     Number of children: N/A     Years of education: N/A  "    Occupational History     Not on file.     Social History Main Topics     Smoking status: Never Smoker     Smokeless tobacco: Never Used     Alcohol use No     Drug use: No     Sexual activity: No     Other Topics Concern     Not on file     Social History Narrative       Family History   Problem Relation Age of Onset     Diabetes Maternal Grandmother      Arthritis Maternal Grandmother      Diabetes Maternal Grandfather      Arthritis Maternal Grandfather      Alcohol/Drug Paternal Grandfather      Alcohol     Asthma Sister      Asthma Sister      Glaucoma No family hx of      Macular Degeneration No family hx of        ROS Pulmonary    A complete ROS was otherwise negative except as noted in the HPI.  Vitals:    08/31/18 0853   BP: 120/82   Pulse: 94   Resp: 18   SpO2: 96%   Weight: (!) 167.8 kg (370 lb)   Height: 1.74 m (5' 8.5\")     Exam:   GENERAL APPEARANCE: Well developed, obese, alert, and in no apparent distress.  NECK: supple, no masses, no thyromegaly.  LYMPHATICS: No significant axillary, cervical, or supraclavicular nodes.  RESP: good air flow throughout, - no crackles, rhonchi or wheezes.  CV: Normal S1, S2, regular rhythm, normal rate, no rub, no murmur,  no gallop, no LE edema.   ABDOMEN:  Bowel sounds normal, soft, nontender, no HSM or masses.   MS: extremities normal- no clubbing, no cyanosis.  NEURO: Mentation intact, speech normal, normal strength and tone, normal gait and stance  PSYCH: mentation appears normal. Affect is flat.  Results: I have reviewed all imaging, PFTs and other relavent tests, please see below for details, PFT and imaging results were reviewed with the patient.  PFTs: Moderate restriction with moderate reduction in DLCO, stable from previous.    Assessment and plan:    28-year-old female with persistent subacute hypersensitivity pneumonitis.  Bony function tests have been stable over the past 2 years which is reassuring however I am certainly concerned that the ongoing " inflammation will ultimately lead to some fibrosis.  However at this point I think it would be reasonable just to continue the treatment with the inhaled steroids and follow her closely.  We have discussed other modalities to try to improve her symptoms including pulmonary rehab which she did previously as well as weight loss.  She understands and will work on this.  She will continue with the Pulmicort twice daily and the albuterol as needed.  I will see her back in 4 months with pulmonary function tests we will do an oxygen titration study at that time.  She will call sooner with any changes in her breathing      CBC   Recent Labs   Lab Test  04/05/16   1755  01/30/15   0655   RBC  4.71  4.99   HGB  10.6*  9.8*   HCT  35.9  34.2*   PLT  404  426       Basic Metabolic Panel  Recent Labs   Lab Test  01/30/15   0655  10/21/14   1845   NA  139  135   POTASSIUM  4.0  3.8   CHLORIDE  108  102   CO2  25  26   BUN  11  7   CT  Red Blood Cells Leukocyte Reduced  Red Blood Cells Leukocyte Reduced   --    GLC  106*  85   ANJELICA  8.7  9.0       INR  Recent Labs   Lab Test  04/05/16   1755  01/30/15   0655   INR  0.97  1.00       PFT  PFT Latest Ref Rng & Units 8/31/2018   FVC L 2.59   FEV1 L 1.99   FVC% % 61   FEV1% % 55       Again, thank you for allowing me to participate in the care of your patient.      Sincerely,    David Morris Perlman, MD

## 2018-08-31 NOTE — PROGRESS NOTES
Reason for Visit  Roxane Holcomb is a 28 year old year old female who is being seen for RECHECK (F/U HP)    ILD HPI    Roxane Holcomb is a 28-year-old female who follows up today for hypersensitivity pneumonitis.  The patient has a diagnosis of hypersensitivity pneumonitis which has manifested itself is mainly persistent groundglass opacities without evidence of fibrosis.  She has had positive serologies do pigeon serum and aspergillus and did previously have a bird however she got rid of the bird quite some time ago and there are no ongoing exposures we are aware of.  I have not seen the patient in 2 years she has significant depression which has prevented her from going to doctor's appointments recently.  She also has morbid obesity with a body mass index of 58.  Returns clinic today overall stating her breathing is stable.  She has not been on any systemic treatment for some time she was supposed to be on inhaled steroids with Pulmicort this was restarted by her primary care physician recently.  She also uses albuterol nebs twice a day and has an albuterol inhaler which she uses intermittently.  She has been on oxygen chronically typically 2 L at rest increasing up to 4 L with exertion.  Overall she does note that she feels weaker than the last time I saw her although she feels this is likely due to inactivity her depression is quite severe and she states some days she is not able to get out of bed at all.  She is undergoing therapy and is on medications for this.      Current Outpatient Prescriptions   Medication     acetaZOLAMIDE (DIAMOX) 250 MG tablet     albuterol (2.5 MG/3ML) 0.083% neb solution     albuterol (PROAIR HFA) 108 (90 Base) MCG/ACT inhaler     budesonide (PULMICORT) 1 MG/2ML SUSP neb solution     clonazePAM (KLONOPIN) 1 MG tablet     Flunisolide HFA 80 MCG/ACT AERS     IBUPROFEN PO     multivitamin, therapeutic with minerals (THERA-VIT-M) TABS     sertraline (ZOLOFT) 100 MG tablet      [DISCONTINUED] albuterol (2.5 MG/3ML) 0.083% nebulizer solution     [DISCONTINUED] albuterol (ALBUTEROL) 108 (90 BASE) MCG/ACT inhaler     [DISCONTINUED] budesonide (PULMICORT) 1 MG/2ML SUSP nebulizer solution     No current facility-administered medications for this visit.      Allergies   Allergen Reactions     Blood Transfusion Related (Informational Only) Other (See Comments)     Patient has a history of a clinically significant antibody against RBC antigens.  A delay in compatible RBCs may occur.     Blood-Group Specific Substance Other (See Comments)     Patient has a history of a clinically significant antibody against RBC antigens.  A delay in compatible RBCs may occur.     Seasonal Allergies      Past Medical History:   Diagnosis Date     Agoraphobia with panic attacks     8/15/2011     Anxiety      Arthritis      Depressive disorder      History of wisdom tooth extraction      Oxygen dependent     2L Nasal cannula     Pneumonitis     1/26/2015       Past Surgical History:   Procedure Laterality Date     DILATION AND CURETTAGE, HYSTEROSCOPY DIAGNOSTIC, COMBINED N/A 2/28/2017    Procedure: COMBINED DILATION AND CURETTAGE, HYSTEROSCOPY DIAGNOSTIC;  Surgeon: Madai Mosqueda MD;  Location: [facility] OR     HYMENOTOMY N/A 2/28/2017    Procedure: HYMENOTOMY;  Surgeon: Madai Mosqueda MD;  Location: [facility] OR     THORACOSCOPIC WEDGE RESECTION LUNG Right 1/30/2015    Procedure: THORACOSCOPIC WEDGE RESECTION LUNG;  Surgeon: Delvin Reyes MD;  Location:  OR       Social History     Social History     Marital status: Single     Spouse name: N/A     Number of children: N/A     Years of education: N/A     Occupational History     Not on file.     Social History Main Topics     Smoking status: Never Smoker     Smokeless tobacco: Never Used     Alcohol use No     Drug use: No     Sexual activity: No     Other Topics Concern     Not on file     Social History Narrative       Family History  "  Problem Relation Age of Onset     Diabetes Maternal Grandmother      Arthritis Maternal Grandmother      Diabetes Maternal Grandfather      Arthritis Maternal Grandfather      Alcohol/Drug Paternal Grandfather      Alcohol     Asthma Sister      Asthma Sister      Glaucoma No family hx of      Macular Degeneration No family hx of        ROS Pulmonary    A complete ROS was otherwise negative except as noted in the HPI.  Vitals:    08/31/18 0853   BP: 120/82   Pulse: 94   Resp: 18   SpO2: 96%   Weight: (!) 167.8 kg (370 lb)   Height: 1.74 m (5' 8.5\")     Exam:   GENERAL APPEARANCE: Well developed, obese, alert, and in no apparent distress.  NECK: supple, no masses, no thyromegaly.  LYMPHATICS: No significant axillary, cervical, or supraclavicular nodes.  RESP: good air flow throughout, - no crackles, rhonchi or wheezes.  CV: Normal S1, S2, regular rhythm, normal rate, no rub, no murmur,  no gallop, no LE edema.   ABDOMEN:  Bowel sounds normal, soft, nontender, no HSM or masses.   MS: extremities normal- no clubbing, no cyanosis.  NEURO: Mentation intact, speech normal, normal strength and tone, normal gait and stance  PSYCH: mentation appears normal. Affect is flat.  Results: I have reviewed all imaging, PFTs and other relavent tests, please see below for details, PFT and imaging results were reviewed with the patient.  PFTs: Moderate restriction with moderate reduction in DLCO, stable from previous.    Assessment and plan:    28-year-old female with persistent subacute hypersensitivity pneumonitis.  Bony function tests have been stable over the past 2 years which is reassuring however I am certainly concerned that the ongoing inflammation will ultimately lead to some fibrosis.  However at this point I think it would be reasonable just to continue the treatment with the inhaled steroids and follow her closely.  We have discussed other modalities to try to improve her symptoms including pulmonary rehab which she did " previously as well as weight loss.  She understands and will work on this.  She will continue with the Pulmicort twice daily and the albuterol as needed.  I will see her back in 4 months with pulmonary function tests we will do an oxygen titration study at that time.  She will call sooner with any changes in her breathing      CBC   Recent Labs   Lab Test  04/05/16   1755  01/30/15   0655   RBC  4.71  4.99   HGB  10.6*  9.8*   HCT  35.9  34.2*   PLT  404  426       Basic Metabolic Panel  Recent Labs   Lab Test  01/30/15   0655  10/21/14   1845   NA  139  135   POTASSIUM  4.0  3.8   CHLORIDE  108  102   CO2  25  26   BUN  11  7   CT  Red Blood Cells Leukocyte Reduced  Red Blood Cells Leukocyte Reduced   --    GLC  106*  85   ANJELICA  8.7  9.0       INR  Recent Labs   Lab Test  04/05/16   1755  01/30/15   0655   INR  0.97  1.00       PFT  PFT Latest Ref Rng & Units 8/31/2018   FVC L 2.59   FEV1 L 1.99   FVC% % 61   FEV1% % 55           CC:

## 2018-09-05 LAB
DLCOUNC-%PRED-PRE: 50 %
DLCOUNC-PRE: 13.19 ML/MIN/MMHG
DLCOUNC-PRED: 26.33 ML/MIN/MMHG
ERV-%PRED-PRE: 73 %
ERV-PRE: 0.27 L
ERV-PRED: 0.36 L
EXPTIME-PRE: 7.84 SEC
FEF2575-%PRED-PRE: 41 %
FEF2575-PRE: 1.62 L/SEC
FEF2575-PRED: 3.88 L/SEC
FEFMAX-%PRED-PRE: 65 %
FEFMAX-PRE: 4.9 L/SEC
FEFMAX-PRED: 7.46 L/SEC
FEV1-%PRED-PRE: 55 %
FEV1-PRE: 1.99 L
FEV1FEV6-PRE: 77 %
FEV1FEV6-PRED: 86 %
FEV1FVC-PRE: 77 %
FEV1FVC-PRED: 85 %
FEV1SVC-PRE: 76 %
FEV1SVC-PRED: 83 %
FIFMAX-PRE: 4.66 L/SEC
FRCPLETH-%PRED-PRE: 66 %
FRCPLETH-PRE: 1.89 L
FRCPLETH-PRED: 2.84 L
FVC-%PRED-PRE: 61 %
FVC-PRE: 2.59 L
FVC-PRED: 4.22 L
IC-%PRED-PRE: 59 %
IC-PRE: 2.35 L
IC-PRED: 3.95 L
RVPLETH-%PRED-PRE: 105 %
RVPLETH-PRE: 1.62 L
RVPLETH-PRED: 1.54 L
TLCPLETH-%PRED-PRE: 77 %
TLCPLETH-PRE: 4.23 L
TLCPLETH-PRED: 5.44 L
VA-%PRED-PRE: 67 %
VA-PRE: 3.73 L
VC-%PRED-PRE: 60 %
VC-PRE: 2.61 L
VC-PRED: 4.31 L

## 2019-02-18 DIAGNOSIS — J45.909 ASTHMA: ICD-10-CM

## 2019-02-18 RX ORDER — ALBUTEROL SULFATE 90 UG/1
2 AEROSOL, METERED RESPIRATORY (INHALATION) EVERY 6 HOURS PRN
Qty: 1 INHALER | Refills: 1 | Status: SHIPPED | OUTPATIENT
Start: 2019-02-18 | End: 2019-08-07

## 2019-04-16 ENCOUNTER — OFFICE VISIT (OUTPATIENT)
Dept: OPHTHALMOLOGY | Facility: CLINIC | Age: 29
End: 2019-04-16
Attending: OPHTHALMOLOGY
Payer: COMMERCIAL

## 2019-04-16 DIAGNOSIS — H47.393 OTHER DISORDERS OF OPTIC DISC, BILATERAL: ICD-10-CM

## 2019-04-16 DIAGNOSIS — G93.2 IDIOPATHIC INTRACRANIAL HYPERTENSION: Primary | ICD-10-CM

## 2019-04-16 PROCEDURE — G0463 HOSPITAL OUTPT CLINIC VISIT: HCPCS | Mod: ZF | Performed by: TECHNICIAN/TECHNOLOGIST

## 2019-04-16 PROCEDURE — 92133 CPTRZD OPH DX IMG PST SGM ON: CPT | Mod: ZF | Performed by: OPHTHALMOLOGY

## 2019-04-16 ASSESSMENT — VISUAL ACUITY
METHOD: SNELLEN - LINEAR
CORRECTION_TYPE: GLASSES
OD_CC: 20/20
OS_CC: 20/20

## 2019-04-16 ASSESSMENT — EXTERNAL EXAM - LEFT EYE: OS_EXAM: NORMAL

## 2019-04-16 ASSESSMENT — REFRACTION_WEARINGRX
OD_CYLINDER: SPHERE
OS_SPHERE: -1.25
OD_SPHERE: -1.25
OS_CYLINDER: SPHERE
SPECS_TYPE: SVL

## 2019-04-16 ASSESSMENT — TONOMETRY
OS_IOP_MMHG: 15
IOP_METHOD: ICARE
OD_IOP_MMHG: 12

## 2019-04-16 ASSESSMENT — SLIT LAMP EXAM - LIDS
COMMENTS: NORMAL
COMMENTS: NORMAL

## 2019-04-16 ASSESSMENT — EXTERNAL EXAM - RIGHT EYE: OD_EXAM: NORMAL

## 2019-04-16 NOTE — NURSING NOTE
Chief Complaint(s) and History of Present Illness(es)     Both eyes are very sensitive that started from 2/18 to 3/20 per patient.   +swollen and red eyes during dates above    Possibly mild vs sever episcleritis per last eye doctor. Was put on prednisolone eye drops but was not helping so they switched to oral steroids.   Patient states oral steroids dramatically brought down the redness and swollen left eye.     No headaches. No double vision. No pulsatile tinnitus.     Leny Petty CO 4/16/2019 2:32 PM

## 2019-04-16 NOTE — PROGRESS NOTES
Assessment & Plan     Roxane Holcomb is a 29 year old female with the following diagnoses:   1. Idiopathic intracranial hypertension         Roxane Holcomb is a 29 year old female presents today for follow-up pseudotumor cerebri. Last visit was 07/03/2018.    At the last visit, her optic disc edema improved while being off Diamox.     This february, patient presented with eye discomfort and thought to have left episcleritis. She tried prednisolone eye drops but later switched to oral steroids, which helped and resolved. She has finished her course of prednisone around the end of march.     Regarding her pseudotumor, she no longer has transient vision loss and headache. No pulsatile tinnitus or diplopia. She was on diamox for a few months but had to stop due to side effects. She thinks her weight is the same as last visit.     Visual acuity with correction is 20/20 in both eyes. No APD. Normal color vision. Intraoccular pressures normal. Slit lamp examination was unremarkable. On fundoscopy, there was elevated and blurred margins of both optic discs with no spontaneous venous pulsations appreciated. Mild tortuous vessels were seen in both eyes.     Overall, improved retinal nerve fiber layer LEFT eye.  RIGHT eye is stable.     It is my impression that this patient's idiopathic intracranial hypertension has continued to improved for the past 9 months while off diamox. She can follow-up with her regular eye doctor or continue to see neuro-ophthalmology in about a year or so.       Attending Physician Attestation:  Complete documentation of historical and exam elements from today's encounter can be found in the full encounter summary report (not reduplicated in this progress note).  I personally obtained the chief complaint(s) and history of present illness.  I confirmed and edited as necessary the review of systems, past medical/surgical history, family history, social history, and examination findings as  documented by others; and I examined the patient myself.  I personally reviewed the relevant tests, images, and reports as documented above.  I formulated and edited as necessary the assessment and plan and discussed the findings and management plan with the patient and family. - Amadou Demarco, MS4  [facility]

## 2019-05-06 ENCOUNTER — DOCUMENTATION ONLY (OUTPATIENT)
Dept: CARE COORDINATION | Facility: CLINIC | Age: 29
End: 2019-05-06

## 2019-08-06 DIAGNOSIS — J45.909 ASTHMA: ICD-10-CM

## 2019-08-07 RX ORDER — ALBUTEROL SULFATE 90 UG/1
2 AEROSOL, METERED RESPIRATORY (INHALATION) EVERY 6 HOURS PRN
Qty: 1 INHALER | Refills: 1 | Status: SHIPPED | OUTPATIENT
Start: 2019-08-07 | End: 2019-11-05

## 2019-09-18 ENCOUNTER — TRANSFERRED RECORDS (OUTPATIENT)
Dept: HEALTH INFORMATION MANAGEMENT | Facility: CLINIC | Age: 29
End: 2019-09-18

## 2019-10-01 ENCOUNTER — OFFICE VISIT (OUTPATIENT)
Dept: PULMONOLOGY | Facility: CLINIC | Age: 29
End: 2019-10-01
Attending: INTERNAL MEDICINE
Payer: COMMERCIAL

## 2019-10-01 VITALS
HEIGHT: 69 IN | HEART RATE: 105 BPM | RESPIRATION RATE: 18 BRPM | BODY MASS INDEX: 43.4 KG/M2 | SYSTOLIC BLOOD PRESSURE: 123 MMHG | DIASTOLIC BLOOD PRESSURE: 86 MMHG | WEIGHT: 293 LBS | OXYGEN SATURATION: 95 %

## 2019-10-01 DIAGNOSIS — J67.9 HYPERSENSITIVITY PNEUMONITIS (H): Primary | ICD-10-CM

## 2019-10-01 DIAGNOSIS — R06.09 DYSPNEA ON EXERTION: ICD-10-CM

## 2019-10-01 LAB
6 MIN WALK (FT): 550 FT
6 MIN WALK (M): 168 M

## 2019-10-01 PROCEDURE — G0463 HOSPITAL OUTPT CLINIC VISIT: HCPCS | Mod: ZF

## 2019-10-01 ASSESSMENT — PATIENT HEALTH QUESTIONNAIRE - PHQ9: SUM OF ALL RESPONSES TO PHQ QUESTIONS 1-9: 23

## 2019-10-01 ASSESSMENT — PAIN SCALES - GENERAL: PAINLEVEL: NO PAIN (0)

## 2019-10-01 ASSESSMENT — MIFFLIN-ST. JEOR: SCORE: 2477.89

## 2019-10-01 NOTE — LETTER
10/1/2019       RE: Roxane Holcomb  [address] [facility] [id number]     Dear Colleague,    Thank you for referring your patient, Roxane Hlocomb, to the [facility] at [facility]. Please see a copy of my visit note below.    Reason for Visit  Roxane Holcomb is a 29 year old year old female who is being seen for RECHECK (Interstitial Lung )    ILD HPI    Roxane Holcomb is a 29-year-old female seen today for follow-up of chronic hypersensitivity pneumonitis.  She has a phenotype that is more like a persistent subacute process with significant groundglass opacities and mosaic attenuation on her CT scan but without significant amounts of fibrosis at least at her recent imaging.  I have not seen the patient in about 1 year.  She returns clinic today overall stating that she does feel her breathing is getting a little bit worse and she is noted increased fatigue.  She has been using her oxygen 2 L at rest 4 L with exertion.  As part of evaluation for the symptoms her primary care physician ordered a CT angiogram of the chest which we do not have the results of but the report found some mild hilar adenopathy.  There was also some abdominal adenopathy and this is been further evaluated on imaging as well and she has been referred to GI for consideration of what I assume is EUS biopsy of the khanh hepatis lymph nodes noted on the abdominal CT scan.  She does not otherwise have any other new specific complaints.      Current Outpatient Medications   Medication     acetaZOLAMIDE (DIAMOX) 250 MG tablet     albuterol (2.5 MG/3ML) 0.083% neb solution     albuterol (PROAIR HFA/PROVENTIL HFA/VENTOLIN HFA) 108 (90 Base) MCG/ACT inhaler     budesonide (PULMICORT) 1 MG/2ML SUSP neb solution     clonazePAM (KLONOPIN) 1 MG tablet     Flunisolide HFA 80 MCG/ACT AERS     IBUPROFEN PO     multivitamin, therapeutic with minerals (THERA-VIT-M) TABS     sertraline  (ZOLOFT) 100 MG tablet     No current facility-administered medications for this visit.      Allergies   Allergen Reactions     Blood Transfusion Related (Informational Only) Other (See Comments)     Patient has a history of a clinically significant antibody against RBC antigens.  A delay in compatible RBCs may occur.     Blood-Group Specific Substance Other (See Comments)     Patient has a history of a clinically significant antibody against RBC antigens.  A delay in compatible RBCs may occur.     Seasonal Allergies      Past Medical History:   Diagnosis Date     Agoraphobia with panic attacks     8/15/2011     Anxiety      Arthritis      Depressive disorder      History of wisdom tooth extraction      Oxygen dependent     2L Nasal cannula     Pneumonitis     1/26/2015       Past Surgical History:   Procedure Laterality Date     DILATION AND CURETTAGE, HYSTEROSCOPY DIAGNOSTIC, COMBINED N/A 2/28/2017    Procedure: COMBINED DILATION AND CURETTAGE, HYSTEROSCOPY DIAGNOSTIC;  Surgeon: Madai Mosqueda MD;  Location: [facility] OR     HYMENOTOMY N/A 2/28/2017    Procedure: HYMENOTOMY;  Surgeon: Madai Mosqueda MD;  Location:  OR     THORACOSCOPIC WEDGE RESECTION LUNG Right 1/30/2015    Procedure: THORACOSCOPIC WEDGE RESECTION LUNG;  Surgeon: Delvin Reyes MD;  Location:  OR       Social History     Socioeconomic History     Marital status: Single     Spouse name: Not on file     Number of children: Not on file     Years of education: Not on file     Highest education level: Not on file   Occupational History     Not on file   Social Needs     Financial resource strain: Not on file     Food insecurity:     Worry: Not on file     Inability: Not on file     Transportation needs:     Medical: Not on file     Non-medical: Not on file   Tobacco Use     Smoking status: Never Smoker     Smokeless tobacco: Never Used   Substance and Sexual Activity     Alcohol use: No     Alcohol/week: 0.0 standard  "drinks     Drug use: No     Sexual activity: Never   Lifestyle     Physical activity:     Days per week: Not on file     Minutes per session: Not on file     Stress: Not on file   Relationships     Social connections:     Talks on phone: Not on file     Gets together: Not on file     Attends Denominational service: Not on file     Active member of club or organization: Not on file     Attends meetings of clubs or organizations: Not on file     Relationship status: Not on file     Intimate partner violence:     Fear of current or ex partner: Not on file     Emotionally abused: Not on file     Physically abused: Not on file     Forced sexual activity: Not on file   Other Topics Concern     Parent/sibling w/ CABG, MI or angioplasty before 65F 55M? Not Asked   Social History Narrative     Not on file       Family History   Problem Relation Age of Onset     Diabetes Maternal Grandmother      Arthritis Maternal Grandmother      Diabetes Maternal Grandfather      Arthritis Maternal Grandfather      Alcohol/Drug Paternal Grandfather         Alcohol     Asthma Sister      Asthma Sister      Glaucoma No family hx of      Macular Degeneration No family hx of        ROS Pulmonary    A complete ROS was otherwise negative except as noted in the HPI.  Vitals:    10/01/19 1356   BP: 123/86   Pulse: 105   Resp: 18   SpO2: 95%   Weight: (!) 169.6 kg (374 lb)   Height: 1.74 m (5' 8.5\")     Exam:   GENERAL APPEARANCE: Well developed, well nourished, alert, and in no apparent distress.  NECK: supple, no masses, no thyromegaly.  LYMPHATICS: No significant axillary, cervical, or supraclavicular nodes.  RESP: good air flow throughout, - no crackles, rhonchi or wheezes.  CV: Normal S1, S2, regular rhythm, normal rate, no rub, no murmur,  no gallop, no LE edema.   ABDOMEN:  Bowel sounds normal, soft, nontender, no HSM or masses.   MS: extremities normal- no clubbing, no cyanosis.  PSYCH: mentation appears normal. and affect " normal/bright  Results: I have reviewed all imaging, PFTs and other relavent tests, please see below for details, PFT and imaging results were reviewed with the patient.  PFTs: Moderate restriction with moderate reduction in diffusing capacity.  Overall stable from previous.    Assessment and plan:    29-year-old female with chronic hypersensitivity pneumonitis that appears relatively stable Shay from point function test.  We will obtain her outside imaging and review it and compared to her previous CT scans.  Particularly we will look to see if this lymphadenopathy is something that is new.  From the reports of CT scans most of the lymph nodes are still not much greater than 1 cm in size so they are not really pathologic.  Overall I am not particularly concerned that this represents any kind of concerning or malignant process.  However would be reasonable to consider biopsy I have however asked the patient to contact us after her appointment with GI so that we can make sure that after we have reviewed the imaging that any procedure planned is reasonable and make sense in the context of her disease.  Otherwise presuming there is no significant progression of disease on the CT scans I will plan to see her back in 6 months with pulmonary function test she will call sooner with any new symptoms.  We will follow-up with the patient after reviewing her outside imaging.      CBC   Recent Labs   Lab Test 04/05/16  1755 01/30/15  0655   RBC 4.71 4.99   HGB 10.6* 9.8*   HCT 35.9 34.2*    426       Basic Metabolic Panel  Recent Labs   Lab Test 01/30/15  0655 10/21/14  1845    135   POTASSIUM 4.0 3.8   CHLORIDE 108 102   CO2 25 26   BUN 11 7   CT Red Blood Cells Leukocyte Reduced  Red Blood Cells Leukocyte Reduced  --    * 85   ANJELICA 8.7 9.0       INR  Recent Labs   Lab Test 04/05/16  1755 01/30/15  0655   INR 0.97 1.00       PFT  PFT Latest Ref Rng & Units 10/1/2019   FVC L 2.52   FEV1 L 1.86   FVC% % 59    FEV1% % 52           Again, thank you for allowing me to participate in the care of your patient.      Sincerely,    David Morris Perlman, MD

## 2019-10-01 NOTE — PROGRESS NOTES
Reason for Visit  Roxane Holcomb is a 29 year old year old female who is being seen for RECHECK (Interstitial Lung )    ILD HPI    Roxane Holcomb is a 29-year-old female seen today for follow-up of chronic hypersensitivity pneumonitis.  She has a phenotype that is more like a persistent subacute process with significant groundglass opacities and mosaic attenuation on her CT scan but without significant amounts of fibrosis at least at her recent imaging.  I have not seen the patient in about 1 year.  She returns clinic today overall stating that she does feel her breathing is getting a little bit worse and she is noted increased fatigue.  She has been using her oxygen 2 L at rest 4 L with exertion.  As part of evaluation for the symptoms her primary care physician ordered a CT angiogram of the chest which we do not have the results of but the report found some mild hilar adenopathy.  There was also some abdominal adenopathy and this is been further evaluated on imaging as well and she has been referred to GI for consideration of what I assume is EUS biopsy of the khanh hepatis lymph nodes noted on the abdominal CT scan.  She does not otherwise have any other new specific complaints.      Current Outpatient Medications   Medication     acetaZOLAMIDE (DIAMOX) 250 MG tablet     albuterol (2.5 MG/3ML) 0.083% neb solution     albuterol (PROAIR HFA/PROVENTIL HFA/VENTOLIN HFA) 108 (90 Base) MCG/ACT inhaler     budesonide (PULMICORT) 1 MG/2ML SUSP neb solution     clonazePAM (KLONOPIN) 1 MG tablet     Flunisolide HFA 80 MCG/ACT AERS     IBUPROFEN PO     multivitamin, therapeutic with minerals (THERA-VIT-M) TABS     sertraline (ZOLOFT) 100 MG tablet     No current facility-administered medications for this visit.      Allergies   Allergen Reactions     Blood Transfusion Related (Informational Only) Other (See Comments)     Patient has a history of a clinically significant antibody against RBC antigens.  A delay in compatible  RBCs may occur.     Blood-Group Specific Substance Other (See Comments)     Patient has a history of a clinically significant antibody against RBC antigens.  A delay in compatible RBCs may occur.     Seasonal Allergies      Past Medical History:   Diagnosis Date     Agoraphobia with panic attacks     8/15/2011     Anxiety      Arthritis      Depressive disorder      History of wisdom tooth extraction      Oxygen dependent     2L Nasal cannula     Pneumonitis     1/26/2015       Past Surgical History:   Procedure Laterality Date     DILATION AND CURETTAGE, HYSTEROSCOPY DIAGNOSTIC, COMBINED N/A 2/28/2017    Procedure: COMBINED DILATION AND CURETTAGE, HYSTEROSCOPY DIAGNOSTIC;  Surgeon: Madai Mosqueda MD;  Location: [facility] OR     HYMENOTOMY N/A 2/28/2017    Procedure: HYMENOTOMY;  Surgeon: Madai Mosqueda MD;  Location: [facility] OR     THORACOSCOPIC WEDGE RESECTION LUNG Right 1/30/2015    Procedure: THORACOSCOPIC WEDGE RESECTION LUNG;  Surgeon: Delvin Reyes MD;  Location:  OR       Social History     Socioeconomic History     Marital status: Single     Spouse name: Not on file     Number of children: Not on file     Years of education: Not on file     Highest education level: Not on file   Occupational History     Not on file   Social Needs     Financial resource strain: Not on file     Food insecurity:     Worry: Not on file     Inability: Not on file     Transportation needs:     Medical: Not on file     Non-medical: Not on file   Tobacco Use     Smoking status: Never Smoker     Smokeless tobacco: Never Used   Substance and Sexual Activity     Alcohol use: No     Alcohol/week: 0.0 standard drinks     Drug use: No     Sexual activity: Never   Lifestyle     Physical activity:     Days per week: Not on file     Minutes per session: Not on file     Stress: Not on file   Relationships     Social connections:     Talks on phone: Not on file     Gets together: Not on file     Attends [employer]  "service: Not on file     Active member of club or organization: Not on file     Attends meetings of clubs or organizations: Not on file     Relationship status: Not on file     Intimate partner violence:     Fear of current or ex partner: Not on file     Emotionally abused: Not on file     Physically abused: Not on file     Forced sexual activity: Not on file   Other Topics Concern     Parent/sibling w/ CABG, MI or angioplasty before 65F 55M? Not Asked   Social History Narrative     Not on file       Family History   Problem Relation Age of Onset     Diabetes Maternal Grandmother      Arthritis Maternal Grandmother      Diabetes Maternal Grandfather      Arthritis Maternal Grandfather      Alcohol/Drug Paternal Grandfather         Alcohol     Asthma Sister      Asthma Sister      Glaucoma No family hx of      Macular Degeneration No family hx of        ROS Pulmonary    A complete ROS was otherwise negative except as noted in the HPI.  Vitals:    10/01/19 1356   BP: 123/86   Pulse: 105   Resp: 18   SpO2: 95%   Weight: (!) 169.6 kg (374 lb)   Height: 1.74 m (5' 8.5\")     Exam:   GENERAL APPEARANCE: Well developed, well nourished, alert, and in no apparent distress.  NECK: supple, no masses, no thyromegaly.  LYMPHATICS: No significant axillary, cervical, or supraclavicular nodes.  RESP: good air flow throughout, - no crackles, rhonchi or wheezes.  CV: Normal S1, S2, regular rhythm, normal rate, no rub, no murmur,  no gallop, no LE edema.   ABDOMEN:  Bowel sounds normal, soft, nontender, no HSM or masses.   MS: extremities normal- no clubbing, no cyanosis.  PSYCH: mentation appears normal. and affect normal/bright  Results: I have reviewed all imaging, PFTs and other relavent tests, please see below for details, PFT and imaging results were reviewed with the patient.  PFTs: Moderate restriction with moderate reduction in diffusing capacity.  Overall stable from previous.    Assessment and plan:    29-year-old female " with chronic hypersensitivity pneumonitis that appears relatively stable Shay from point function test.  We will obtain her outside imaging and review it and compared to her previous CT scans.  Particularly we will look to see if this lymphadenopathy is something that is new.  From the reports of CT scans most of the lymph nodes are still not much greater than 1 cm in size so they are not really pathologic.  Overall I am not particularly concerned that this represents any kind of concerning or malignant process.  However would be reasonable to consider biopsy I have however asked the patient to contact us after her appointment with GI so that we can make sure that after we have reviewed the imaging that any procedure planned is reasonable and make sense in the context of her disease.  Otherwise presuming there is no significant progression of disease on the CT scans I will plan to see her back in 6 months with pulmonary function test she will call sooner with any new symptoms.  We will follow-up with the patient after reviewing her outside imaging.      CBC   Recent Labs   Lab Test 04/05/16  1755 01/30/15  0655   RBC 4.71 4.99   HGB 10.6* 9.8*   HCT 35.9 34.2*    426       Basic Metabolic Panel  Recent Labs   Lab Test 01/30/15  0655 10/21/14  1845    135   POTASSIUM 4.0 3.8   CHLORIDE 108 102   CO2 25 26   BUN 11 7   CT Red Blood Cells Leukocyte Reduced  Red Blood Cells Leukocyte Reduced  --    * 85   ANJELICA 8.7 9.0       INR  Recent Labs   Lab Test 04/05/16  1755 01/30/15  0655   INR 0.97 1.00       PFT  PFT Latest Ref Rng & Units 10/1/2019   FVC L 2.52   FEV1 L 1.86   FVC% % 59   FEV1% % 52           CC:

## 2019-10-01 NOTE — NURSING NOTE
Chief Complaint   Patient presents with     RECHECK     Interstitial Lung     Medications reviewed and vital signs taken.   Domenica Noguera, CMA

## 2019-10-02 LAB
DLCOUNC-%PRED-PRE: 48 %
DLCOUNC-PRE: 12.12 ML/MIN/MMHG
DLCOUNC-PRED: 24.76 ML/MIN/MMHG
ERV-%PRED-PRE: 72 %
ERV-PRE: 0.24 L
ERV-PRED: 0.34 L
EXPTIME-PRE: 7.19 SEC
FEF2575-%PRED-PRE: 35 %
FEF2575-PRE: 1.38 L/SEC
FEF2575-PRED: 3.86 L/SEC
FEFMAX-%PRED-PRE: 65 %
FEFMAX-PRE: 4.92 L/SEC
FEFMAX-PRED: 7.46 L/SEC
FEV1-%PRED-PRE: 52 %
FEV1-PRE: 1.86 L
FEV1FEV6-PRE: 74 %
FEV1FEV6-PRED: 86 %
FEV1FVC-PRE: 74 %
FEV1FVC-PRED: 85 %
FEV1SVC-PRE: 72 %
FEV1SVC-PRED: 83 %
FIFMAX-PRE: 4.63 L/SEC
FVC-%PRED-PRE: 59 %
FVC-PRE: 2.52 L
FVC-PRED: 4.22 L
IC-%PRED-PRE: 59 %
IC-PRE: 2.34 L
IC-PRED: 3.96 L
VA-%PRED-PRE: 74 %
VA-PRE: 4.09 L
VC-%PRED-PRE: 60 %
VC-PRE: 2.59 L
VC-PRED: 4.3 L

## 2019-10-27 DIAGNOSIS — J67.9 HYPERSENSITIVITY PNEUMONITIS (H): ICD-10-CM

## 2019-10-28 RX ORDER — ALBUTEROL SULFATE 0.83 MG/ML
SOLUTION RESPIRATORY (INHALATION)
Qty: 360 ML | Refills: 11 | Status: SHIPPED | OUTPATIENT
Start: 2019-10-28

## 2019-11-05 DIAGNOSIS — J67.9 HYPERSENSITIVITY PNEUMONITIS (H): ICD-10-CM

## 2019-11-05 DIAGNOSIS — J45.909 ASTHMA: ICD-10-CM

## 2019-11-05 RX ORDER — ALBUTEROL SULFATE 90 UG/1
AEROSOL, METERED RESPIRATORY (INHALATION)
Qty: 8.5 G | Refills: 3 | Status: SHIPPED | OUTPATIENT
Start: 2019-11-05

## 2019-11-05 RX ORDER — BUDESONIDE 1 MG/2ML
INHALANT ORAL
Qty: 120 ML | Refills: 3 | Status: SHIPPED | OUTPATIENT
Start: 2019-11-05

## 2020-01-27 ENCOUNTER — PATIENT OUTREACH (OUTPATIENT)
Dept: PULMONOLOGY | Facility: CLINIC | Age: 30
End: 2020-01-27

## 2020-01-27 DIAGNOSIS — J67.9 HYPERSENSITIVITY PNEUMONITIS (H): Primary | ICD-10-CM

## 2020-01-27 DIAGNOSIS — Z99.81 SUPPLEMENTAL OXYGEN DEPENDENT: ICD-10-CM

## 2020-01-27 DIAGNOSIS — R09.02 HYPOXIA: ICD-10-CM

## 2020-01-27 NOTE — PROGRESS NOTES
Patient's mom contacted, oxygen company, [employer], is asking for oxygen prescription. Also asking for order for shower chair to be sent to [facility]. Both orders faxed.

## 2022-06-16 NOTE — NURSING NOTE
Chief Complaints and History of Present Illnesses   Patient presents with     Follow Up For     idiopathic intracranial hypertension      HPI    Symptoms:              Comments:  Follow up for idiopathic intracranial hypertension and optic disc edema.    Patient says blurred vision of both eyes and sharp eye pain in both eyes. Denies headaches.  Does not have prescription of Diamox.    DAVIS Wing 7/3/2018 7:49 AM                Hydroxychloroquine Counseling:  I discussed with the patient that a baseline ophthalmologic exam is needed at the start of therapy and every year thereafter while on therapy. A CBC may also be warranted for monitoring.  The side effects of this medication were discussed with the patient, including but not limited to agranulocytosis, aplastic anemia, seizures, rashes, retinopathy, and liver toxicity. Patient instructed to call the office should any adverse effect occur.  The patient verbalized understanding of the proper use and possible adverse effects of Plaquenil.  All the patient's questions and concerns were addressed.

## (undated) DEVICE — LINEN FULL SHEET 5511

## (undated) DEVICE — DRAPE POUCH IRR 1016

## (undated) DEVICE — SEAL SET MYOSURE ROD LENS SCOPE SINGLE USE 40-902

## (undated) DEVICE — GLOVE PROTEXIS POWDER FREE 6.5 ORTHOPEDIC 2D73ET65

## (undated) DEVICE — SUCTION CANISTER STRAW 65652-008

## (undated) DEVICE — ESU PENCIL W/HOLSTER E2350H

## (undated) DEVICE — NDL COUNTER 20CT 31142493

## (undated) DEVICE — GLOVE ESTEEM POWDER FREE SMT 7.0  2D72PT70

## (undated) DEVICE — BLADE KNIFE SURG 15 371115

## (undated) DEVICE — DILATOR PROBE UTERINE OS CANAL FINDER 260-610

## (undated) DEVICE — CATH INTERMITTENT CLEAN-CATH FEMALE 14FR 6" VINYL LF 420614

## (undated) DEVICE — SU VICRYL 3-0 SH 8X18" UND J864D

## (undated) DEVICE — TUBING IRR TUR Y TYPE 2C4041

## (undated) DEVICE — NDL 22GA 1.5"

## (undated) DEVICE — SPONGE RAY-TEC 4X8" 7318

## (undated) DEVICE — ESU GROUND PAD ADULT W/CORD E7507

## (undated) DEVICE — BASIN SET MINOR DISP

## (undated) DEVICE — TUBING SYS AQUILEX BLUE INFLOW AQL-110 YLW OUTFLOW AQL-111

## (undated) DEVICE — SUCTION CANISTER MEDIVAC LINER 3000ML W/LID 65651-530

## (undated) DEVICE — SOL NACL 0.9% IRRIG 3000ML BAG 2B7477

## (undated) DEVICE — SYR 10ML FINGER CONTROL W/O NDL 309695

## (undated) DEVICE — LINEN HALF SHEET 5512

## (undated) DEVICE — SOL NACL 0.9% IRRIG 1000ML BOTTLE 07138-09

## (undated) DEVICE — SUCTION CANISTER BEMIS HI FLOW 006772-901

## (undated) DEVICE — PAD CHUX UNDERPAD 30X36" P3036C

## (undated) DEVICE — TUBING SUCTION 12"X1/4" N612

## (undated) DEVICE — SOL NACL 0.9% INJ 1000ML BAG 2B1324X

## (undated) DEVICE — BAG CLEAR TRASH 1.3M 39X33" P4040C

## (undated) DEVICE — PACK MINOR LITHOTOMY RIDGES

## (undated) DEVICE — GOWN XLG DISP 9545

## (undated) DEVICE — GLOVE PROTEXIS BLUE W/NEU-THERA 7.0  2D73EB70

## (undated) RX ORDER — PROPOFOL 10 MG/ML
INJECTION, EMULSION INTRAVENOUS
Status: DISPENSED
Start: 2017-02-28

## (undated) RX ORDER — ACETAMINOPHEN 500 MG
TABLET ORAL
Status: DISPENSED
Start: 2017-02-28

## (undated) RX ORDER — FENTANYL CITRATE 50 UG/ML
INJECTION, SOLUTION INTRAMUSCULAR; INTRAVENOUS
Status: DISPENSED
Start: 2017-02-28

## (undated) RX ORDER — CEFAZOLIN SODIUM 1 G/50ML
SOLUTION INTRAVENOUS
Status: DISPENSED
Start: 2017-02-28

## (undated) RX ORDER — DEXAMETHASONE SODIUM PHOSPHATE 4 MG/ML
INJECTION, SOLUTION INTRA-ARTICULAR; INTRALESIONAL; INTRAMUSCULAR; INTRAVENOUS; SOFT TISSUE
Status: DISPENSED
Start: 2017-02-28

## (undated) RX ORDER — GLYCOPYRROLATE 0.2 MG/ML
INJECTION INTRAMUSCULAR; INTRAVENOUS
Status: DISPENSED
Start: 2017-02-28

## (undated) RX ORDER — KETOROLAC TROMETHAMINE 30 MG/ML
INJECTION, SOLUTION INTRAMUSCULAR; INTRAVENOUS
Status: DISPENSED
Start: 2017-02-28

## (undated) RX ORDER — LIDOCAINE HYDROCHLORIDE 10 MG/ML
INJECTION, SOLUTION EPIDURAL; INFILTRATION; INTRACAUDAL; PERINEURAL
Status: DISPENSED
Start: 2017-02-28

## (undated) RX ORDER — HYDROCODONE BITARTRATE AND ACETAMINOPHEN 5; 325 MG/1; MG/1
TABLET ORAL
Status: DISPENSED
Start: 2017-02-28